# Patient Record
Sex: MALE | Race: WHITE | Employment: OTHER
[De-identification: names, ages, dates, MRNs, and addresses within clinical notes are randomized per-mention and may not be internally consistent; named-entity substitution may affect disease eponyms.]

---

## 2024-05-28 ENCOUNTER — APPOINTMENT (OUTPATIENT)
Facility: HOSPITAL | Age: 79
DRG: 233 | End: 2024-05-28
Attending: STUDENT IN AN ORGANIZED HEALTH CARE EDUCATION/TRAINING PROGRAM
Payer: MEDICARE

## 2024-05-28 ENCOUNTER — HOSPITAL ENCOUNTER (INPATIENT)
Facility: HOSPITAL | Age: 79
LOS: 5 days | Discharge: HOME HEALTH | DRG: 233 | End: 2024-06-04
Attending: STUDENT IN AN ORGANIZED HEALTH CARE EDUCATION/TRAINING PROGRAM | Admitting: THORACIC SURGERY (CARDIOTHORACIC VASCULAR SURGERY)
Payer: MEDICARE

## 2024-05-28 ENCOUNTER — PREP FOR PROCEDURE (OUTPATIENT)
Age: 79
End: 2024-05-28

## 2024-05-28 DIAGNOSIS — R94.39 ABNORMAL BALLISTOCARDIOGRAM: ICD-10-CM

## 2024-05-28 DIAGNOSIS — I25.119 CORONARY ARTERY DISEASE INVOLVING NATIVE CORONARY ARTERY OF NATIVE HEART WITH ANGINA PECTORIS (HCC): Primary | ICD-10-CM

## 2024-05-28 DIAGNOSIS — Z95.1 S/P CABG X 4: ICD-10-CM

## 2024-05-28 PROBLEM — I25.10 CORONARY ARTERY DISEASE: Status: ACTIVE | Noted: 2024-05-28

## 2024-05-28 LAB
ALBUMIN SERPL-MCNC: 3.3 G/DL (ref 3.5–5)
ALBUMIN/GLOB SERPL: 1 (ref 1.1–2.2)
ALP SERPL-CCNC: 67 U/L (ref 45–117)
ALT SERPL-CCNC: 24 U/L (ref 12–78)
ANION GAP SERPL CALC-SCNC: 5 MMOL/L (ref 5–15)
APPEARANCE UR: CLEAR
APTT PPP: 30.2 SEC (ref 22.1–31)
ARTERIAL PATENCY WRIST A: POSITIVE
AST SERPL-CCNC: 12 U/L (ref 15–37)
BACTERIA URNS QL MICRO: NEGATIVE /HPF
BASE DEFICIT BLD-SCNC: 0.2 MMOL/L
BASOPHILS # BLD: 0.1 K/UL (ref 0–0.1)
BASOPHILS NFR BLD: 1 % (ref 0–1)
BDY SITE: ABNORMAL
BILIRUB DIRECT SERPL-MCNC: 0.2 MG/DL (ref 0–0.2)
BILIRUB SERPL-MCNC: 1 MG/DL (ref 0.2–1)
BILIRUB UR QL: NEGATIVE
BUN SERPL-MCNC: 22 MG/DL (ref 6–20)
BUN/CREAT SERPL: 22 (ref 12–20)
CALCIUM SERPL-MCNC: 9.3 MG/DL (ref 8.5–10.1)
CHLORIDE SERPL-SCNC: 107 MMOL/L (ref 97–108)
CO2 SERPL-SCNC: 27 MMOL/L (ref 21–32)
COLOR UR: NORMAL
CREAT SERPL-MCNC: 1 MG/DL (ref 0.7–1.3)
DIFFERENTIAL METHOD BLD: ABNORMAL
ECHO BSA: 1.92 M2
EOSINOPHIL # BLD: 0.6 K/UL (ref 0–0.4)
EOSINOPHIL NFR BLD: 8 % (ref 0–7)
EPITH CASTS URNS QL MICRO: NORMAL /LPF
ERYTHROCYTE [DISTWIDTH] IN BLOOD BY AUTOMATED COUNT: 12.3 % (ref 11.5–14.5)
EST. AVERAGE GLUCOSE BLD GHB EST-MCNC: 111 MG/DL
FIBRINOGEN PPP-MCNC: 395 MG/DL (ref 200–475)
GAS FLOW.O2 O2 DELIVERY SYS: ABNORMAL
GLOBULIN SER CALC-MCNC: 3.4 G/DL (ref 2–4)
GLUCOSE SERPL-MCNC: 112 MG/DL (ref 65–100)
GLUCOSE UR STRIP.AUTO-MCNC: NEGATIVE MG/DL
HBA1C MFR BLD: 5.5 % (ref 4–5.6)
HCO3 BLD-SCNC: 24 MMOL/L (ref 22–26)
HCT VFR BLD AUTO: 46.7 % (ref 36.6–50.3)
HGB BLD-MCNC: 15.5 G/DL (ref 12.1–17)
HGB UR QL STRIP: NEGATIVE
HISTORY CHECK: NORMAL
HYALINE CASTS URNS QL MICRO: NORMAL /LPF (ref 0–2)
IMM GRANULOCYTES # BLD AUTO: 0 K/UL (ref 0–0.04)
IMM GRANULOCYTES NFR BLD AUTO: 1 % (ref 0–0.5)
INR PPP: 1 (ref 0.9–1.1)
KETONES UR QL STRIP.AUTO: NEGATIVE MG/DL
LEUKOCYTE ESTERASE UR QL STRIP.AUTO: NEGATIVE
LYMPHOCYTES # BLD: 2.1 K/UL (ref 0.8–3.5)
LYMPHOCYTES NFR BLD: 26 % (ref 12–49)
MAGNESIUM SERPL-MCNC: 2 MG/DL (ref 1.6–2.4)
MCH RBC QN AUTO: 31.4 PG (ref 26–34)
MCHC RBC AUTO-ENTMCNC: 33.2 G/DL (ref 30–36.5)
MCV RBC AUTO: 94.5 FL (ref 80–99)
MONOCYTES # BLD: 0.9 K/UL (ref 0–1)
MONOCYTES NFR BLD: 11 % (ref 5–13)
NEUTS SEG # BLD: 4.2 K/UL (ref 1.8–8)
NEUTS SEG NFR BLD: 53 % (ref 32–75)
NITRITE UR QL STRIP.AUTO: NEGATIVE
NRBC # BLD: 0 K/UL (ref 0–0.01)
NRBC BLD-RTO: 0 PER 100 WBC
NT PRO BNP: 488 PG/ML
PCO2 BLD: 37.2 MMHG (ref 35–45)
PH BLD: 7.42 (ref 7.35–7.45)
PH UR STRIP: 6.5 (ref 5–8)
PLATELET # BLD AUTO: 280 K/UL (ref 150–400)
PMV BLD AUTO: 9.9 FL (ref 8.9–12.9)
PO2 BLD: 76 MMHG (ref 80–100)
POTASSIUM SERPL-SCNC: 4 MMOL/L (ref 3.5–5.1)
PROT SERPL-MCNC: 6.7 G/DL (ref 6.4–8.2)
PROT UR STRIP-MCNC: NEGATIVE MG/DL
PROTHROMBIN TIME: 10.7 SEC (ref 9–11.1)
RBC # BLD AUTO: 4.94 M/UL (ref 4.1–5.7)
RBC #/AREA URNS HPF: NORMAL /HPF (ref 0–5)
SAO2 % BLD: 95.3 % (ref 92–97)
SODIUM SERPL-SCNC: 139 MMOL/L (ref 136–145)
SP GR UR REFRACTOMETRY: 1.01 (ref 1–1.03)
SPECIMEN TYPE: ABNORMAL
THERAPEUTIC RANGE: NORMAL SECS (ref 58–77)
URINE CULTURE IF INDICATED: NORMAL
UROBILINOGEN UR QL STRIP.AUTO: 1 EU/DL (ref 0.2–1)
WBC # BLD AUTO: 8 K/UL (ref 4.1–11.1)
WBC URNS QL MICRO: NORMAL /HPF (ref 0–4)

## 2024-05-28 PROCEDURE — 2580000003 HC RX 258: Performed by: NURSE PRACTITIONER

## 2024-05-28 PROCEDURE — 82803 BLOOD GASES ANY COMBINATION: CPT

## 2024-05-28 PROCEDURE — 86901 BLOOD TYPING SEROLOGIC RH(D): CPT

## 2024-05-28 PROCEDURE — 6370000000 HC RX 637 (ALT 250 FOR IP): Performed by: STUDENT IN AN ORGANIZED HEALTH CARE EDUCATION/TRAINING PROGRAM

## 2024-05-28 PROCEDURE — 36415 COLL VENOUS BLD VENIPUNCTURE: CPT

## 2024-05-28 PROCEDURE — 6370000000 HC RX 637 (ALT 250 FOR IP): Performed by: NURSE PRACTITIONER

## 2024-05-28 PROCEDURE — 85610 PROTHROMBIN TIME: CPT

## 2024-05-28 PROCEDURE — 86923 COMPATIBILITY TEST ELECTRIC: CPT

## 2024-05-28 PROCEDURE — 71046 X-RAY EXAM CHEST 2 VIEWS: CPT

## 2024-05-28 PROCEDURE — 93458 L HRT ARTERY/VENTRICLE ANGIO: CPT | Performed by: STUDENT IN AN ORGANIZED HEALTH CARE EDUCATION/TRAINING PROGRAM

## 2024-05-28 PROCEDURE — 83735 ASSAY OF MAGNESIUM: CPT

## 2024-05-28 PROCEDURE — 6360000004 HC RX CONTRAST MEDICATION: Performed by: NURSE PRACTITIONER

## 2024-05-28 PROCEDURE — 99223 1ST HOSP IP/OBS HIGH 75: CPT | Performed by: THORACIC SURGERY (CARDIOTHORACIC VASCULAR SURGERY)

## 2024-05-28 PROCEDURE — C1769 GUIDE WIRE: HCPCS | Performed by: STUDENT IN AN ORGANIZED HEALTH CARE EDUCATION/TRAINING PROGRAM

## 2024-05-28 PROCEDURE — 86850 RBC ANTIBODY SCREEN: CPT

## 2024-05-28 PROCEDURE — 80048 BASIC METABOLIC PNL TOTAL CA: CPT

## 2024-05-28 PROCEDURE — 6360000002 HC RX W HCPCS: Performed by: STUDENT IN AN ORGANIZED HEALTH CARE EDUCATION/TRAINING PROGRAM

## 2024-05-28 PROCEDURE — 2500000003 HC RX 250 WO HCPCS: Performed by: STUDENT IN AN ORGANIZED HEALTH CARE EDUCATION/TRAINING PROGRAM

## 2024-05-28 PROCEDURE — 80076 HEPATIC FUNCTION PANEL: CPT

## 2024-05-28 PROCEDURE — C1894 INTRO/SHEATH, NON-LASER: HCPCS | Performed by: STUDENT IN AN ORGANIZED HEALTH CARE EDUCATION/TRAINING PROGRAM

## 2024-05-28 PROCEDURE — 99153 MOD SED SAME PHYS/QHP EA: CPT | Performed by: STUDENT IN AN ORGANIZED HEALTH CARE EDUCATION/TRAINING PROGRAM

## 2024-05-28 PROCEDURE — 81001 URINALYSIS AUTO W/SCOPE: CPT

## 2024-05-28 PROCEDURE — 99152 MOD SED SAME PHYS/QHP 5/>YRS: CPT | Performed by: STUDENT IN AN ORGANIZED HEALTH CARE EDUCATION/TRAINING PROGRAM

## 2024-05-28 PROCEDURE — 4A023N7 MEASUREMENT OF CARDIAC SAMPLING AND PRESSURE, LEFT HEART, PERCUTANEOUS APPROACH: ICD-10-PCS | Performed by: STUDENT IN AN ORGANIZED HEALTH CARE EDUCATION/TRAINING PROGRAM

## 2024-05-28 PROCEDURE — 85730 THROMBOPLASTIN TIME PARTIAL: CPT

## 2024-05-28 PROCEDURE — 86900 BLOOD TYPING SEROLOGIC ABO: CPT

## 2024-05-28 PROCEDURE — 85384 FIBRINOGEN ACTIVITY: CPT

## 2024-05-28 PROCEDURE — 2580000003 HC RX 258: Performed by: STUDENT IN AN ORGANIZED HEALTH CARE EDUCATION/TRAINING PROGRAM

## 2024-05-28 PROCEDURE — 71275 CT ANGIOGRAPHY CHEST: CPT

## 2024-05-28 PROCEDURE — 2709999900 HC NON-CHARGEABLE SUPPLY: Performed by: STUDENT IN AN ORGANIZED HEALTH CARE EDUCATION/TRAINING PROGRAM

## 2024-05-28 PROCEDURE — 83036 HEMOGLOBIN GLYCOSYLATED A1C: CPT

## 2024-05-28 PROCEDURE — 83880 ASSAY OF NATRIURETIC PEPTIDE: CPT

## 2024-05-28 PROCEDURE — 85025 COMPLETE CBC W/AUTO DIFF WBC: CPT

## 2024-05-28 PROCEDURE — 6360000004 HC RX CONTRAST MEDICATION: Performed by: STUDENT IN AN ORGANIZED HEALTH CARE EDUCATION/TRAINING PROGRAM

## 2024-05-28 PROCEDURE — B2111ZZ FLUOROSCOPY OF MULTIPLE CORONARY ARTERIES USING LOW OSMOLAR CONTRAST: ICD-10-PCS | Performed by: STUDENT IN AN ORGANIZED HEALTH CARE EDUCATION/TRAINING PROGRAM

## 2024-05-28 PROCEDURE — 84443 ASSAY THYROID STIM HORMONE: CPT

## 2024-05-28 PROCEDURE — 36600 WITHDRAWAL OF ARTERIAL BLOOD: CPT

## 2024-05-28 RX ORDER — ATORVASTATIN CALCIUM 10 MG/1
10 TABLET, FILM COATED ORAL DAILY
Status: DISCONTINUED | OUTPATIENT
Start: 2024-05-28 | End: 2024-05-29

## 2024-05-28 RX ORDER — ACETAMINOPHEN 325 MG/1
650 TABLET ORAL EVERY 4 HOURS PRN
Status: DISCONTINUED | OUTPATIENT
Start: 2024-05-28 | End: 2024-05-30

## 2024-05-28 RX ORDER — HYDROCHLOROTHIAZIDE 25 MG/1
25 TABLET ORAL DAILY
Status: ON HOLD | COMMUNITY
End: 2024-06-03 | Stop reason: HOSPADM

## 2024-05-28 RX ORDER — ATORVASTATIN CALCIUM 10 MG/1
10 TABLET, FILM COATED ORAL DAILY
Status: ON HOLD | COMMUNITY
End: 2024-06-03 | Stop reason: HOSPADM

## 2024-05-28 RX ORDER — VERAPAMIL HYDROCHLORIDE 2.5 MG/ML
INJECTION, SOLUTION INTRAVENOUS PRN
Status: DISCONTINUED | OUTPATIENT
Start: 2024-05-28 | End: 2024-05-28 | Stop reason: HOSPADM

## 2024-05-28 RX ORDER — HEPARIN SODIUM 1000 [USP'U]/ML
INJECTION, SOLUTION INTRAVENOUS; SUBCUTANEOUS PRN
Status: DISCONTINUED | OUTPATIENT
Start: 2024-05-28 | End: 2024-05-28 | Stop reason: HOSPADM

## 2024-05-28 RX ORDER — SODIUM CHLORIDE 0.9 % (FLUSH) 0.9 %
5-40 SYRINGE (ML) INJECTION PRN
Status: DISCONTINUED | OUTPATIENT
Start: 2024-05-28 | End: 2024-05-30

## 2024-05-28 RX ORDER — HYDRALAZINE HYDROCHLORIDE 20 MG/ML
10 INJECTION INTRAMUSCULAR; INTRAVENOUS EVERY 10 MIN PRN
Status: DISCONTINUED | OUTPATIENT
Start: 2024-05-28 | End: 2024-05-30

## 2024-05-28 RX ORDER — FENTANYL CITRATE 50 UG/ML
INJECTION, SOLUTION INTRAMUSCULAR; INTRAVENOUS PRN
Status: DISCONTINUED | OUTPATIENT
Start: 2024-05-28 | End: 2024-05-28 | Stop reason: HOSPADM

## 2024-05-28 RX ORDER — HEPARIN SODIUM 10000 [USP'U]/ML
INJECTION, SOLUTION INTRAVENOUS; SUBCUTANEOUS PRN
Status: DISCONTINUED | OUTPATIENT
Start: 2024-05-28 | End: 2024-05-28 | Stop reason: HOSPADM

## 2024-05-28 RX ORDER — SODIUM CHLORIDE 0.9 % (FLUSH) 0.9 %
5-40 SYRINGE (ML) INJECTION EVERY 12 HOURS SCHEDULED
Status: DISCONTINUED | OUTPATIENT
Start: 2024-05-28 | End: 2024-05-30

## 2024-05-28 RX ORDER — LIDOCAINE HYDROCHLORIDE 10 MG/ML
INJECTION, SOLUTION INFILTRATION; PERINEURAL PRN
Status: DISCONTINUED | OUTPATIENT
Start: 2024-05-28 | End: 2024-05-28 | Stop reason: HOSPADM

## 2024-05-28 RX ORDER — ASPIRIN 81 MG/1
81 TABLET, CHEWABLE ORAL DAILY
Status: DISCONTINUED | OUTPATIENT
Start: 2024-05-28 | End: 2024-06-04 | Stop reason: HOSPADM

## 2024-05-28 RX ORDER — CHLORHEXIDINE GLUCONATE ORAL RINSE 1.2 MG/ML
15 SOLUTION DENTAL 2 TIMES DAILY
Status: DISCONTINUED | OUTPATIENT
Start: 2024-05-28 | End: 2024-05-30

## 2024-05-28 RX ORDER — SODIUM CHLORIDE 9 MG/ML
INJECTION, SOLUTION INTRAVENOUS PRN
Status: DISCONTINUED | OUTPATIENT
Start: 2024-05-28 | End: 2024-05-30

## 2024-05-28 RX ORDER — ONDANSETRON 2 MG/ML
4 INJECTION INTRAMUSCULAR; INTRAVENOUS EVERY 6 HOURS PRN
Status: DISCONTINUED | OUTPATIENT
Start: 2024-05-28 | End: 2024-05-30

## 2024-05-28 RX ORDER — SODIUM CHLORIDE 0.9 % (FLUSH) 0.9 %
5-40 SYRINGE (ML) INJECTION EVERY 8 HOURS
Status: DISCONTINUED | OUTPATIENT
Start: 2024-05-28 | End: 2024-05-30

## 2024-05-28 RX ORDER — ASPIRIN 81 MG/1
81 TABLET, CHEWABLE ORAL DAILY
COMMUNITY

## 2024-05-28 RX ORDER — AMIODARONE HYDROCHLORIDE 200 MG/1
400 TABLET ORAL 2 TIMES DAILY
Status: DISCONTINUED | OUTPATIENT
Start: 2024-05-28 | End: 2024-05-28

## 2024-05-28 RX ADMIN — 0.12% CHLORHEXIDINE GLUCONATE 15 ML: 1.2 RINSE ORAL at 20:54

## 2024-05-28 RX ADMIN — IOPAMIDOL 100 ML: 755 INJECTION, SOLUTION INTRAVENOUS at 15:26

## 2024-05-28 RX ADMIN — SODIUM CHLORIDE, PRESERVATIVE FREE 10 ML: 5 INJECTION INTRAVENOUS at 20:55

## 2024-05-28 RX ADMIN — ASPIRIN 81 MG CHEWABLE TABLET 81 MG: 81 TABLET CHEWABLE at 20:54

## 2024-05-28 RX ADMIN — 0.12% CHLORHEXIDINE GLUCONATE 15 ML: 1.2 RINSE ORAL at 14:26

## 2024-05-28 RX ADMIN — ATORVASTATIN CALCIUM 10 MG: 10 TABLET, FILM COATED ORAL at 20:54

## 2024-05-28 RX ADMIN — METOPROLOL TARTRATE 12.5 MG: 25 TABLET, FILM COATED ORAL at 20:54

## 2024-05-28 RX ADMIN — MUPIROCIN: 20 OINTMENT TOPICAL at 14:26

## 2024-05-28 RX ADMIN — SODIUM CHLORIDE, PRESERVATIVE FREE 10 ML: 5 INJECTION INTRAVENOUS at 13:00

## 2024-05-28 RX ADMIN — MUPIROCIN: 20 OINTMENT TOPICAL at 20:54

## 2024-05-28 RX ADMIN — SODIUM CHLORIDE, PRESERVATIVE FREE 10 ML: 5 INJECTION INTRAVENOUS at 20:54

## 2024-05-28 NOTE — H&P
JVP- not well appreciated   RS: Non labored, clear   CVS: Regular rate and rhythm, S1S2, no murmur   Abd: Soft, non tender, non distended   Lower extremity: Warm to touch, Edema- None   Skin: Warm and dry, No significant bruises or rash   CNS: Oriented x3, no focal neuro deficit     Lab review:       CBC:  Lab Results   Component Value Date/Time    WBC 8.0 05/28/2024 08:02 AM    HGB 15.5 05/28/2024 08:02 AM    HCT 46.7 05/28/2024 08:02 AM     05/28/2024 08:02 AM    MCV 94.5 05/28/2024 08:02 AM       All Cardiac Markers in the last 24 hours:  @[email or web address](CPK:*,CK:*,CPKMB:*,CKRMB:*,CKMMB:*,CKMB:*,RCK3:*,CKMBT:*,CKMBPC:*,CKMBPOC:*,CKSMB:*,CKNDX:*,CKND1:*,JAEL:*,TROQR:*,TROPT:*,TROIQ:*,TROIP:*,AUGUSTINE:*,TROPIT:*,TROPT:*,TRPOIT:*,ITNL:*,TNIPOC:*,BNP:*,BNPP:*,PBNP:*,BNPNT:*)@    Data review:  EKG tracing personally reviewed:     Echocardiogram:    Other cardiac testing:    Other imaging:        Signed:  Issa Lentz MD  Interventional Cardiology  5/28/2024

## 2024-05-28 NOTE — PROGRESS NOTES
Cardiac Cath Lab Recovery Arrival Note:      Sukhdeep Lomeli arrived to Cardiac Cath Lab, Recovery Area. Staff introduced to patient. Patient identifiers verified with NAME and DATE OF BIRTH. Procedure verified with patient. Consent forms reviewed and signed by patient or authorized representative and verified. Allergies verified.     Patient and family oriented to department. Patient and family informed of procedure and plan of care.     Questions answered with review. Patient prepped for procedure, per orders from physician, prior to arrival.    Patient on cardiac monitor, non-invasive blood pressure, SPO2 monitor. On RA. Patient is A&Ox 4. Patient reports no pain.     Patient in stretcher, in low position, with side rails up, call bell within reach, patient instructed to call if assistance as needed.    Patient prep in: [facility], [place] [facility].     Family in: Naomi, wife.   Prep by: Norma

## 2024-05-28 NOTE — PROGRESS NOTES
9:50 AM Patient arrived to IVCU from Cath Lab. Right radial, TR band at 12cc, CDI, no bleeding/hematoma. Immobilizer in place. VSS, AV Paced, RA, Afebrile. Patient on bedrest. Family at bedside, but has not talked to the physician about the results of the cath.     11:29 AM Cardiac surgery NP at bedside with patient and spouse. Right radial CDI, no bleeding/hematoma.      12:30 PM Labs and EKG complete.     1:30 PM TR band off. Right radial site CDI, no bleeding/hematoma. Gauze and tegaderm over site. Pt educated that they can remove the dressing in 24hrs and wash it with soap and water (no lotions/bravo). Bedrest complete, patient ambulated x 0 assist to the bathroom to void. Urine sample sent.     3 PM Patient CHRISTAL to radiology.     3:45 PM Patient returned to IVCU. MD Chadwick at bedside. Incentive spirometry education given by RN.    7:46 PM Bedside shift report given to SHANICE Musa.

## 2024-05-29 ENCOUNTER — APPOINTMENT (OUTPATIENT)
Facility: HOSPITAL | Age: 79
DRG: 233 | End: 2024-05-29
Attending: STUDENT IN AN ORGANIZED HEALTH CARE EDUCATION/TRAINING PROGRAM
Payer: MEDICARE

## 2024-05-29 ENCOUNTER — ANESTHESIA EVENT (OUTPATIENT)
Facility: HOSPITAL | Age: 79
End: 2024-05-29
Payer: MEDICARE

## 2024-05-29 PROBLEM — I65.23 BILATERAL CAROTID ARTERY STENOSIS: Status: ACTIVE | Noted: 2024-05-29

## 2024-05-29 PROBLEM — Z01.810 PREOP CARDIOVASCULAR EXAM: Status: ACTIVE | Noted: 2024-05-29

## 2024-05-29 LAB
ECHO BSA: 1.92 M2
EKG ATRIAL RATE: 60 BPM
EKG DIAGNOSIS: NORMAL
EKG P AXIS: 35 DEGREES
EKG P-R INTERVAL: 104 MS
EKG Q-T INTERVAL: 476 MS
EKG QRS DURATION: 150 MS
EKG QTC CALCULATION (BAZETT): 476 MS
EKG R AXIS: -43 DEGREES
EKG T AXIS: 88 DEGREES
EKG VENTRICULAR RATE: 60 BPM
TSH SERPL DL<=0.05 MIU/L-ACNC: 2.81 UIU/ML (ref 0.36–3.74)
VAS LEFT CCA DIST EDV: 0 CM/S
VAS LEFT CCA DIST PSV: 98.4 CM/S
VAS LEFT CCA PROX EDV: 13.6 CM/S
VAS LEFT CCA PROX PSV: 96.4 CM/S
VAS LEFT ECA EDV: 0 CM/S
VAS LEFT ECA PSV: 61.7 CM/S
VAS LEFT ICA DIST EDV: 18.1 CM/S
VAS LEFT ICA DIST PSV: 87.6 CM/S
VAS LEFT ICA MID EDV: 14.9 CM/S
VAS LEFT ICA MID PSV: 69.8 CM/S
VAS LEFT ICA PROX EDV: 11.6 CM/S
VAS LEFT ICA PROX PSV: 61.7 CM/S
VAS LEFT ICA/CCA PSV: 0.89 NO UNITS
VAS LEFT SUBCLAVIAN PROX EDV: 0 CM/S
VAS LEFT SUBCLAVIAN PROX PSV: 126 CM/S
VAS LEFT VERTEBRAL EDV: 6.81 CM/S
VAS LEFT VERTEBRAL PSV: 37.6 CM/S
VAS RIGHT CCA DIST EDV: 6.3 CM/S
VAS RIGHT CCA DIST PSV: 65.9 CM/S
VAS RIGHT CCA PROX EDV: 0 CM/S
VAS RIGHT CCA PROX PSV: 82.7 CM/S
VAS RIGHT ECA EDV: 0 CM/S
VAS RIGHT ECA PSV: 96.9 CM/S
VAS RIGHT ICA DIST EDV: 11.5 CM/S
VAS RIGHT ICA DIST PSV: 52.9 CM/S
VAS RIGHT ICA MID EDV: 14.1 CM/S
VAS RIGHT ICA MID PSV: 68.5 CM/S
VAS RIGHT ICA PROX EDV: 11.5 CM/S
VAS RIGHT ICA PROX PSV: 62 CM/S
VAS RIGHT ICA/CCA PSV: 1 NO UNITS
VAS RIGHT SUBCLAVIAN PROX EDV: 0 CM/S
VAS RIGHT SUBCLAVIAN PROX PSV: 122 CM/S
VAS RIGHT VERTEBRAL EDV: 9.51 CM/S
VAS RIGHT VERTEBRAL PSV: 33.1 CM/S

## 2024-05-29 PROCEDURE — 94060 EVALUATION OF WHEEZING: CPT

## 2024-05-29 PROCEDURE — 2580000003 HC RX 258: Performed by: STUDENT IN AN ORGANIZED HEALTH CARE EDUCATION/TRAINING PROGRAM

## 2024-05-29 PROCEDURE — 2580000003 HC RX 258: Performed by: NURSE PRACTITIONER

## 2024-05-29 PROCEDURE — 94640 AIRWAY INHALATION TREATMENT: CPT

## 2024-05-29 PROCEDURE — 6370000000 HC RX 637 (ALT 250 FOR IP): Performed by: THORACIC SURGERY (CARDIOTHORACIC VASCULAR SURGERY)

## 2024-05-29 PROCEDURE — 6370000000 HC RX 637 (ALT 250 FOR IP): Performed by: STUDENT IN AN ORGANIZED HEALTH CARE EDUCATION/TRAINING PROGRAM

## 2024-05-29 PROCEDURE — 84443 ASSAY THYROID STIM HORMONE: CPT

## 2024-05-29 PROCEDURE — 36415 COLL VENOUS BLD VENIPUNCTURE: CPT

## 2024-05-29 PROCEDURE — 93880 EXTRACRANIAL BILAT STUDY: CPT | Performed by: PSYCHIATRY & NEUROLOGY

## 2024-05-29 PROCEDURE — 93970 EXTREMITY STUDY: CPT

## 2024-05-29 PROCEDURE — 6370000000 HC RX 637 (ALT 250 FOR IP): Performed by: NURSE PRACTITIONER

## 2024-05-29 PROCEDURE — 93880 EXTRACRANIAL BILAT STUDY: CPT

## 2024-05-29 PROCEDURE — 94729 DIFFUSING CAPACITY: CPT

## 2024-05-29 RX ORDER — NALOXONE HYDROCHLORIDE 0.4 MG/ML
INJECTION, SOLUTION INTRAMUSCULAR; INTRAVENOUS; SUBCUTANEOUS PRN
Status: CANCELLED | OUTPATIENT
Start: 2024-05-29

## 2024-05-29 RX ORDER — OXYCODONE HYDROCHLORIDE 5 MG/1
5 TABLET ORAL
Status: CANCELLED | OUTPATIENT
Start: 2024-05-29 | End: 2024-05-30

## 2024-05-29 RX ORDER — IPRATROPIUM BROMIDE AND ALBUTEROL SULFATE 2.5; .5 MG/3ML; MG/3ML
1 SOLUTION RESPIRATORY (INHALATION)
Status: COMPLETED | OUTPATIENT
Start: 2024-05-29 | End: 2024-05-29

## 2024-05-29 RX ORDER — ATORVASTATIN CALCIUM 40 MG/1
40 TABLET, FILM COATED ORAL DAILY
Status: DISCONTINUED | OUTPATIENT
Start: 2024-05-29 | End: 2024-06-04 | Stop reason: HOSPADM

## 2024-05-29 RX ORDER — FENTANYL CITRATE 50 UG/ML
100 INJECTION, SOLUTION INTRAMUSCULAR; INTRAVENOUS
Status: CANCELLED | OUTPATIENT
Start: 2024-05-29 | End: 2024-05-30

## 2024-05-29 RX ORDER — HYDRALAZINE HYDROCHLORIDE 20 MG/ML
10 INJECTION INTRAMUSCULAR; INTRAVENOUS
Status: CANCELLED | OUTPATIENT
Start: 2024-05-29

## 2024-05-29 RX ORDER — MIDAZOLAM HYDROCHLORIDE 2 MG/2ML
2 INJECTION, SOLUTION INTRAMUSCULAR; INTRAVENOUS
Status: CANCELLED | OUTPATIENT
Start: 2024-05-29 | End: 2024-05-30

## 2024-05-29 RX ORDER — ACETAMINOPHEN 500 MG
1000 TABLET ORAL ONCE
Status: CANCELLED | OUTPATIENT
Start: 2024-05-29 | End: 2024-05-29

## 2024-05-29 RX ORDER — AMIODARONE HYDROCHLORIDE 200 MG/1
400 TABLET ORAL 2 TIMES DAILY
Status: DISCONTINUED | OUTPATIENT
Start: 2024-05-29 | End: 2024-05-30

## 2024-05-29 RX ORDER — SODIUM CHLORIDE, SODIUM LACTATE, POTASSIUM CHLORIDE, CALCIUM CHLORIDE 600; 310; 30; 20 MG/100ML; MG/100ML; MG/100ML; MG/100ML
INJECTION, SOLUTION INTRAVENOUS CONTINUOUS
Status: CANCELLED | OUTPATIENT
Start: 2024-05-29

## 2024-05-29 RX ORDER — LIDOCAINE HYDROCHLORIDE 10 MG/ML
1 INJECTION, SOLUTION EPIDURAL; INFILTRATION; INTRACAUDAL; PERINEURAL
Status: CANCELLED | OUTPATIENT
Start: 2024-05-29 | End: 2024-05-30

## 2024-05-29 RX ORDER — SODIUM CHLORIDE 0.9 % (FLUSH) 0.9 %
5-40 SYRINGE (ML) INJECTION PRN
Status: CANCELLED | OUTPATIENT
Start: 2024-05-29

## 2024-05-29 RX ORDER — SODIUM CHLORIDE 0.9 % (FLUSH) 0.9 %
5-40 SYRINGE (ML) INJECTION EVERY 12 HOURS SCHEDULED
Status: CANCELLED | OUTPATIENT
Start: 2024-05-29

## 2024-05-29 RX ORDER — SODIUM CHLORIDE 9 MG/ML
INJECTION, SOLUTION INTRAVENOUS PRN
Status: CANCELLED | OUTPATIENT
Start: 2024-05-29

## 2024-05-29 RX ORDER — ONDANSETRON 2 MG/ML
4 INJECTION INTRAMUSCULAR; INTRAVENOUS
Status: CANCELLED | OUTPATIENT
Start: 2024-05-29 | End: 2024-05-30

## 2024-05-29 RX ORDER — FENTANYL CITRATE 50 UG/ML
25 INJECTION, SOLUTION INTRAMUSCULAR; INTRAVENOUS EVERY 5 MIN PRN
Status: CANCELLED | OUTPATIENT
Start: 2024-05-29

## 2024-05-29 RX ORDER — PROCHLORPERAZINE EDISYLATE 5 MG/ML
5 INJECTION INTRAMUSCULAR; INTRAVENOUS
Status: CANCELLED | OUTPATIENT
Start: 2024-05-29 | End: 2024-05-30

## 2024-05-29 RX ORDER — HYDROMORPHONE HYDROCHLORIDE 1 MG/ML
0.5 INJECTION, SOLUTION INTRAMUSCULAR; INTRAVENOUS; SUBCUTANEOUS EVERY 5 MIN PRN
Status: CANCELLED | OUTPATIENT
Start: 2024-05-29

## 2024-05-29 RX ORDER — AMLODIPINE BESYLATE 5 MG/1
5 TABLET ORAL DAILY
Status: DISCONTINUED | OUTPATIENT
Start: 2024-05-29 | End: 2024-05-30

## 2024-05-29 RX ADMIN — METOPROLOL TARTRATE 25 MG: 25 TABLET, FILM COATED ORAL at 21:03

## 2024-05-29 RX ADMIN — IPRATROPIUM BROMIDE AND ALBUTEROL SULFATE 1 DOSE: .5; 3 SOLUTION RESPIRATORY (INHALATION) at 09:55

## 2024-05-29 RX ADMIN — ATORVASTATIN CALCIUM 40 MG: 40 TABLET, FILM COATED ORAL at 21:02

## 2024-05-29 RX ADMIN — SODIUM CHLORIDE, PRESERVATIVE FREE 10 ML: 5 INJECTION INTRAVENOUS at 21:08

## 2024-05-29 RX ADMIN — AMLODIPINE BESYLATE 5 MG: 5 TABLET ORAL at 12:46

## 2024-05-29 RX ADMIN — SODIUM CHLORIDE, PRESERVATIVE FREE 10 ML: 5 INJECTION INTRAVENOUS at 08:29

## 2024-05-29 RX ADMIN — AMIODARONE HYDROCHLORIDE 400 MG: 200 TABLET ORAL at 21:03

## 2024-05-29 RX ADMIN — SODIUM CHLORIDE, PRESERVATIVE FREE 10 ML: 5 INJECTION INTRAVENOUS at 21:09

## 2024-05-29 RX ADMIN — 0.12% CHLORHEXIDINE GLUCONATE 15 ML: 1.2 RINSE ORAL at 08:35

## 2024-05-29 RX ADMIN — MUPIROCIN: 20 OINTMENT TOPICAL at 23:34

## 2024-05-29 RX ADMIN — 0.12% CHLORHEXIDINE GLUCONATE 15 ML: 1.2 RINSE ORAL at 21:07

## 2024-05-29 RX ADMIN — MUPIROCIN: 20 OINTMENT TOPICAL at 10:38

## 2024-05-29 RX ADMIN — ASPIRIN 81 MG CHEWABLE TABLET 81 MG: 81 TABLET CHEWABLE at 21:02

## 2024-05-29 RX ADMIN — METOPROLOL TARTRATE 12.5 MG: 25 TABLET, FILM COATED ORAL at 08:28

## 2024-05-29 RX ADMIN — SODIUM CHLORIDE, PRESERVATIVE FREE 10 ML: 5 INJECTION INTRAVENOUS at 14:10

## 2024-05-29 NOTE — PROGRESS NOTES
Progress Note      5/29/2024 10:10 AM  NAME: Sukhdeep Lomeli   MRN:  [id number]   Admit Diagnosis: Abnormal ballistocardiogram [R94.39]           Assessment:     1.  CAD and multivessel disease involving LM disease and  of OM    Second-degree AV block s/p pacemaker  3.  Hypertension  Hyperlipidemia  Mild systolic heart failure with ejection fraction 42% by MPI       Cath:  Severe three-vessel coronary artery disease including severe left main bifurcation disease and  of obtuse marginal branch    LVEDP 20 mmHg     Recommendations:      Cont aspirin   Cont lipitor > increase dose to 40 mg po daily   Cont metoprolol   Add amlodipine for elevated BP     CT surgery following for CABG tomorrow         [x]        High complexity decision making was performed    Subjective:     HPI:     No CP or SOB     ROS: No CP, SOB, Abd pain, nausea, vomiting, syncope, palpitations, new focal neurological symptoms     Objective:      Physical Exam:    Last 24hrs VS reviewed since prior progress note. Most recent are:    BP (!) 169/76   Pulse 60   Temp 98 °F (36.7 °C) (Oral)   Resp 18   Ht 1.676 m (5' 6\")   Wt 79.4 kg (175 lb)   SpO2 95%   BMI 28.25 kg/m²   No intake or output data in the 24 hours ending 05/29/24 1010      General: Alert and oriented x3, no acute distress   Neck: Supple   Respiratory: No respiratory distress, clear lung sound   Cardiovascular: Regular rate rhythm, S1S2, no murmur   Abdomen: soft, non tender, non distended   Neuro: moves all extremities, oriented x3   Skin: warm and dry   Extremity: no edema, warm to touch      Data Review     Tele: NSR    Lab Data Personally Reviewed:    Recent Labs     05/28/24  0802   WBC 8.0   HGB 15.5   HCT 46.7        Recent Labs     05/28/24  1252   INR 1.0   APTT 30.2      Recent Labs     05/28/24  0802 05/28/24  1252     --    K 4.0  --      --    CO2 27  --    BUN 22*  --    MG  --  2.0     No results for input(s): \"CPK\" in the last 72

## 2024-05-29 NOTE — PROGRESS NOTES
Cardiac Surgery FLOOR Progress Note    Admit Date: 2024        Subjective:   Pt seen with Dr. Chadwick. Afebrile, on RA. Denies CP. In bed, no complaints.     Objective:     Vitals:    24 1002   BP:    Pulse:    Resp:    Temp:    SpO2: 95%       Temp (24hrs), Av.8 °F (36.6 °C), Min:97.5 °F (36.4 °C), Max:98 °F (36.7 °C)      Last 24hr Input/Output:  No intake or output data in the 24 hours ending 24 1007     EKG: paced    Oxygen: RA        Admission Weight: Last Weight   Weight - Scale: 79.4 kg (175 lb) Weight - Scale: 79.4 kg (175 lb)       EXAM:      Lungs:   Clear to auscultation bilaterally.       Heart:  Regular rate and rhythm, S1, S2 normal, no murmur, click, rub or gallop.     Abdomen:   Soft, non-tender. Bowel sounds normal. No masses,  No organomegaly.   Extremities:  No edema. PPP.      Neurologic:  Gross motor and sensory apparatus intact.       Activity: up ad ally    Diet:  cardiac    Lab Data Reviewed:   Recent Labs     24  0802 24  1252   WBC 8.0  --    HGB 15.5  --    HCT 46.7  --      --    INR  --  1.0       Cardiac Testing:   Diagnostics:   PA and lateral: Xray Result (most recent):  XR CHEST STANDARD TWO VW 2024    Narrative  CLINICAL HISTORY: preop heart workup  INDICATION:   preop heart workup  COMPARISON: None    FINDINGS:  PA and lateral views of the chest are obtained.  The cardiopericardial silhouette is within normal limits. There is no pleural  effusion, pneumothorax or focal consolidation present. Cardiac pacer. Right  shoulder arthroplasty.    Impression  No acute intrathoracic process.         Vein mapping: pending     Carotid doppler: pending     PFTS-FEV1: pending     EKG:         Encounter Date: 24   EKG 12 Lead   Result Value     Ventricular Rate 60     Atrial Rate 60     P-R Interval 104     QRS Duration 150     Q-T Interval 476     QTc Calculation (Bazett) 476     P Axis 35     R Axis -43     T Axis 88     Diagnosis

## 2024-05-29 NOTE — PROGRESS NOTES
End of Shift Note    Bedside shift change report given to SHANICE Chanel (oncoming nurse) by Dimple Valentine RN (offgoing nurse).  Report included the following information SBAR, Kardex, ED Summary, Intake/Output, and MAR    Shift worked:  Night     Shift summary and any significant changes:     No acute changes overnight      Concerns for physician to address:       Zone phone for oncoming shift:          Activity:     Number times ambulated in hallways past shift: 2  Number of times OOB to chair past shift: 1    Cardiac:   Cardiac Monitoring: Yes           Access:  Current line(s): PIV     Genitourinary:   Urinary status: voiding    Respiratory:      Chronic home O2 use?: NO  Incentive spirometer at bedside: YES       GI:     Current diet:  ADULT DIET; Regular; Low Fat/Low Chol/High Fiber/2 gm Na  ADULT ORAL NUTRITION SUPPLEMENT; Breakfast, Lunch, Dinner; Low Calorie/High Protein Oral Supplement  Diet NPO Exceptions are: Sips of Clear Liquids  Passing flatus: YES  Tolerating current diet: YES       Pain Management:   Patient states pain is manageable on current regimen: YES    Skin:     Interventions: increase time out of bed    Patient Safety:  Fall Score:    Interventions: gripper socks, pt to call before getting OOB, and stay with me (per policy)       Length of Stay:  Expected LOS:   Actual LOS: 0      Dimple Valentine RN

## 2024-05-29 NOTE — PROGRESS NOTES
Cardiac Surgery Care Coordinator met with Sukhdeep Lomeli. Introduced role of the Cardiac Surgery Care Coordinator.  Reviewed plan of care and began pre-op education.  Discussed day of surgery expectations for the pt and family.  He is able to pull 2500ml on the spirometer.  Provided ERAS binder and reviewed material including Cardiac Surgery Pathway. Reinforced sternal precautions and keeping your move in the tube. Encouraged Sukhdeep Lomeli  to verbalize and offered emotional support. Di Lambert RN

## 2024-05-30 ENCOUNTER — APPOINTMENT (OUTPATIENT)
Facility: HOSPITAL | Age: 79
DRG: 233 | End: 2024-05-30
Attending: STUDENT IN AN ORGANIZED HEALTH CARE EDUCATION/TRAINING PROGRAM
Payer: MEDICARE

## 2024-05-30 ENCOUNTER — HOSPITAL ENCOUNTER (OUTPATIENT)
Facility: HOSPITAL | Age: 79
Discharge: HOME | End: 2024-06-01
Attending: THORACIC SURGERY (CARDIOTHORACIC VASCULAR SURGERY)

## 2024-05-30 ENCOUNTER — ANESTHESIA (OUTPATIENT)
Facility: HOSPITAL | Age: 79
End: 2024-05-30
Payer: MEDICARE

## 2024-05-30 PROBLEM — I25.10 CAD IN NATIVE ARTERY: Status: ACTIVE | Noted: 2024-05-30

## 2024-05-30 PROBLEM — Z95.1 S/P CABG X 4: Status: ACTIVE | Noted: 2024-05-30

## 2024-05-30 PROBLEM — Z98.890 S/P LEFT ATRIAL APPENDAGE LIGATION: Status: ACTIVE | Noted: 2024-05-30

## 2024-05-30 LAB
ABO + RH BLD: NORMAL
ACUTE KIDNEY INJURY RISK NEPHROCHECK: 0.27 (ref 0–0.3)
ALBUMIN SERPL-MCNC: 3.4 G/DL (ref 3.5–5)
ALBUMIN SERPL-MCNC: 3.5 G/DL (ref 3.5–5)
ALBUMIN/GLOB SERPL: 1.6 (ref 1.1–2.2)
ALBUMIN/GLOB SERPL: 1.6 (ref 1.1–2.2)
ALP SERPL-CCNC: 44 U/L (ref 45–117)
ALT SERPL-CCNC: 19 U/L (ref 12–78)
ALT SERPL-CCNC: 21 U/L (ref 12–78)
ANION GAP BLD CALC-SCNC: 12 (ref 10–20)
ANION GAP BLD CALC-SCNC: 13 (ref 10–20)
ANION GAP BLD CALC-SCNC: 6 (ref 10–20)
ANION GAP BLD CALC-SCNC: 8 (ref 10–20)
ANION GAP BLD CALC-SCNC: 9 (ref 10–20)
ANION GAP SERPL CALC-SCNC: 3 MMOL/L (ref 5–15)
ANION GAP SERPL CALC-SCNC: 5 MMOL/L (ref 5–15)
APTT PPP: 29.1 SEC (ref 22.1–31)
ARTERIAL PATENCY WRIST A: ABNORMAL
ARTERIAL PATENCY WRIST A: ABNORMAL
AST SERPL-CCNC: 29 U/L (ref 15–37)
AST SERPL-CCNC: 32 U/L (ref 15–37)
BASE DEFICIT BLD-SCNC: 1 MMOL/L
BASE DEFICIT BLD-SCNC: 1.5 MMOL/L
BASE DEFICIT BLD-SCNC: 1.8 MMOL/L
BASE DEFICIT BLD-SCNC: 2.3 MMOL/L
BASE DEFICIT BLD-SCNC: 4.2 MMOL/L
BASE DEFICIT BLD-SCNC: 4.2 MMOL/L
BASE DEFICIT BLD-SCNC: 4.7 MMOL/L
BASE DEFICIT BLDA-SCNC: 4.9 MMOL/L
BASE DEFICIT BLDA-SCNC: 5.5 MMOL/L
BASE EXCESS BLD CALC-SCNC: 0.1 MMOL/L
BDY SITE: ABNORMAL
BDY SITE: ABNORMAL
BILIRUB SERPL-MCNC: 0.6 MG/DL (ref 0.2–1)
BILIRUB SERPL-MCNC: 1.2 MG/DL (ref 0.2–1)
BLD PROD TYP BPU: NORMAL
BLD PROD TYP BPU: NORMAL
BLOOD BANK DISPENSE STATUS: NORMAL
BLOOD BANK DISPENSE STATUS: NORMAL
BLOOD GROUP ANTIBODIES SERPL: NORMAL
BPU ID: NORMAL
BPU ID: NORMAL
BUN SERPL-MCNC: 16 MG/DL (ref 6–20)
BUN SERPL-MCNC: 18 MG/DL (ref 6–20)
BUN/CREAT SERPL: 16 (ref 12–20)
BUN/CREAT SERPL: 20 (ref 12–20)
CA-I BLD-MCNC: 1.04 MMOL/L (ref 1.12–1.32)
CA-I BLD-MCNC: 1.04 MMOL/L (ref 1.12–1.32)
CA-I BLD-MCNC: 1.06 MMOL/L (ref 1.12–1.32)
CA-I BLD-MCNC: 1.08 MMOL/L (ref 1.12–1.32)
CA-I BLD-MCNC: 1.13 MMOL/L (ref 1.12–1.32)
CA-I BLD-MCNC: 1.13 MMOL/L (ref 1.12–1.32)
CA-I BLD-MCNC: 1.19 MMOL/L (ref 1.12–1.32)
CA-I BLD-MCNC: 1.22 MMOL/L (ref 1.12–1.32)
CA-I BLD-SCNC: 1.07 MMOL/L (ref 1.13–1.32)
CHLORIDE BLD-SCNC: 105 MMOL/L (ref 100–108)
CHLORIDE BLD-SCNC: 106 MMOL/L (ref 100–108)
CHLORIDE BLD-SCNC: 108 MMOL/L (ref 100–108)
CHLORIDE BLD-SCNC: 108 MMOL/L (ref 100–108)
CHLORIDE BLD-SCNC: 110 MMOL/L (ref 100–108)
CHLORIDE BLD-SCNC: 111 MMOL/L (ref 100–108)
CHLORIDE SERPL-SCNC: 115 MMOL/L (ref 97–108)
CHLORIDE SERPL-SCNC: 116 MMOL/L (ref 97–108)
CO2 BLD-SCNC: 20 MMOL/L (ref 19–24)
CO2 BLD-SCNC: 21 MMOL/L (ref 19–24)
CO2 BLD-SCNC: 22 MMOL/L (ref 19–24)
CO2 BLD-SCNC: 23 MMOL/L (ref 19–24)
CO2 BLD-SCNC: 26 MMOL/L (ref 19–24)
CO2 SERPL-SCNC: 23 MMOL/L (ref 21–32)
CO2 SERPL-SCNC: 23 MMOL/L (ref 21–32)
CREAT SERPL-MCNC: 0.89 MG/DL (ref 0.7–1.3)
CREAT SERPL-MCNC: 0.97 MG/DL (ref 0.7–1.3)
CREAT UR-MCNC: 0.6 MG/DL (ref 0.6–1.3)
CREAT UR-MCNC: 0.7 MG/DL (ref 0.6–1.3)
CREAT UR-MCNC: 0.8 MG/DL (ref 0.6–1.3)
CROSSMATCH RESULT: NORMAL
CROSSMATCH RESULT: NORMAL
ECHO BSA: 1.92 M2
ERYTHROCYTE [DISTWIDTH] IN BLOOD BY AUTOMATED COUNT: 12.6 % (ref 11.5–14.5)
FIO2 ON VENT: 40 %
FIO2 ON VENT: 60 %
GAS FLOW.O2 SETTING OXYMISER: 16
GLOBULIN SER CALC-MCNC: 2.1 G/DL (ref 2–4)
GLOBULIN SER CALC-MCNC: 2.2 G/DL (ref 2–4)
GLUCOSE BLD STRIP.AUTO-MCNC: 104 MG/DL (ref 65–117)
GLUCOSE BLD STRIP.AUTO-MCNC: 104 MG/DL (ref 74–99)
GLUCOSE BLD STRIP.AUTO-MCNC: 107 MG/DL (ref 65–117)
GLUCOSE BLD STRIP.AUTO-MCNC: 116 MG/DL (ref 65–117)
GLUCOSE BLD STRIP.AUTO-MCNC: 116 MG/DL (ref 74–99)
GLUCOSE BLD STRIP.AUTO-MCNC: 119 MG/DL (ref 65–117)
GLUCOSE BLD STRIP.AUTO-MCNC: 127 MG/DL (ref 65–117)
GLUCOSE BLD STRIP.AUTO-MCNC: 135 MG/DL (ref 65–117)
GLUCOSE BLD STRIP.AUTO-MCNC: 138 MG/DL (ref 65–117)
GLUCOSE BLD STRIP.AUTO-MCNC: 139 MG/DL (ref 74–99)
GLUCOSE BLD STRIP.AUTO-MCNC: 146 MG/DL (ref 65–117)
GLUCOSE BLD STRIP.AUTO-MCNC: 162 MG/DL (ref 74–99)
GLUCOSE BLD STRIP.AUTO-MCNC: 175 MG/DL (ref 74–99)
GLUCOSE BLD STRIP.AUTO-MCNC: 180 MG/DL (ref 74–99)
GLUCOSE BLD STRIP.AUTO-MCNC: 205 MG/DL (ref 74–99)
GLUCOSE BLD STRIP.AUTO-MCNC: 54 MG/DL (ref 65–117)
GLUCOSE BLD STRIP.AUTO-MCNC: 91 MG/DL (ref 65–117)
GLUCOSE BLD STRIP.AUTO-MCNC: 94 MG/DL (ref 74–99)
GLUCOSE SERPL-MCNC: 175 MG/DL (ref 65–100)
GLUCOSE SERPL-MCNC: 73 MG/DL (ref 65–100)
HCO3 BLDA-SCNC: 20 MMOL/L (ref 22–26)
HCO3 BLDA-SCNC: 20 MMOL/L (ref 22–26)
HCO3 BLDA-SCNC: 21 MMOL/L
HCO3 BLDA-SCNC: 22 MMOL/L
HCO3 BLDA-SCNC: 23 MMOL/L
HCO3 BLDA-SCNC: 24 MMOL/L
HCO3 BLDA-SCNC: 24 MMOL/L
HCO3 BLDA-SCNC: 26 MMOL/L
HCT VFR BLD AUTO: 36.4 % (ref 36.6–50.3)
HCT VFR BLD AUTO: 38.6 % (ref 36.6–50.3)
HGB BLD-MCNC: 12 G/DL (ref 12.1–17)
HGB BLD-MCNC: 12.7 G/DL (ref 12.1–17)
INR PPP: 1.2 (ref 0.9–1.1)
LACTATE BLD-SCNC: 0.54 MMOL/L (ref 0.4–2)
LACTATE BLD-SCNC: 0.78 MMOL/L (ref 0.4–2)
LACTATE BLD-SCNC: 0.79 MMOL/L (ref 0.4–2)
LACTATE BLD-SCNC: 0.79 MMOL/L (ref 0.4–2)
LACTATE BLD-SCNC: 1.56 MMOL/L (ref 0.4–2)
LACTATE BLD-SCNC: 2.11 MMOL/L (ref 0.4–2)
LACTATE BLD-SCNC: <0.3 MMOL/L (ref 0.4–2)
LACTATE BLD-SCNC: <0.3 MMOL/L (ref 0.4–2)
MAGNESIUM SERPL-MCNC: 3 MG/DL (ref 1.6–2.4)
MCH RBC QN AUTO: 31.2 PG (ref 26–34)
MCH RBC QN AUTO: 31.4 PG (ref 26–34)
MCHC RBC AUTO-ENTMCNC: 32.9 G/DL (ref 30–36.5)
MCHC RBC AUTO-ENTMCNC: 33 G/DL (ref 30–36.5)
MCV RBC AUTO: 94.8 FL (ref 80–99)
MCV RBC AUTO: 95.3 FL (ref 80–99)
NRBC # BLD: 0 K/UL (ref 0–0.01)
NRBC # BLD: 0 K/UL (ref 0–0.01)
NRBC BLD-RTO: 0 PER 100 WBC
PCO2 BLD: 36.1 MMHG (ref 35–45)
PCO2 BLD: 38 MMHG (ref 35–45)
PCO2 BLD: 39.6 MMHG (ref 35–45)
PCO2 BLD: 40.4 MMHG (ref 35–45)
PCO2 BLD: 45.2 MMHG (ref 35–45)
PCO2 BLD: 50.6 MMHG (ref 35–45)
PCO2 BLDA: 37 MMHG (ref 35–45)
PCO2 BLDV: 37.4 MMHG (ref 41–51)
PCO2 BLDV: 41.2 MMHG (ref 41–51)
PEEP RESPIRATORY: 5
PH BLD: 7.27 (ref 7.35–7.45)
PH BLD: 7.33 (ref 7.35–7.45)
PH BLD: 7.37 (ref 7.35–7.45)
PH BLD: 7.38 (ref 7.35–7.45)
PH BLD: 7.4 (ref 7.35–7.45)
PH BLD: 7.41 (ref 7.35–7.45)
PH BLDA: 7.34 (ref 7.35–7.45)
PH BLDA: 7.35 (ref 7.35–7.45)
PH BLDV: 7.35 (ref 7.32–7.42)
PH BLDV: 7.36 (ref 7.32–7.42)
PLATELET # BLD AUTO: 176 K/UL (ref 150–400)
PLATELET # BLD AUTO: 198 K/UL (ref 150–400)
PMV BLD AUTO: 9.9 FL (ref 8.9–12.9)
PO2 BLD: 199 MMHG (ref 80–100)
PO2 BLD: 211 MMHG (ref 80–100)
PO2 BLD: 381 MMHG (ref 80–100)
PO2 BLD: 387 MMHG (ref 80–100)
PO2 BLD: 404 MMHG (ref 80–100)
PO2 BLD: 456 MMHG (ref 80–100)
PO2 BLDA: 104 MMHG (ref 80–100)
PO2 BLDA: 150 MMHG (ref 80–100)
PO2 BLDV: 293 MMHG (ref 25–40)
PO2 BLDV: 51 MMHG (ref 25–40)
POTASSIUM BLD-SCNC: 3.6 MMOL/L (ref 3.5–5.5)
POTASSIUM BLD-SCNC: 4.2 MMOL/L (ref 3.5–5.5)
POTASSIUM BLD-SCNC: 4.3 MMOL/L (ref 3.5–5.5)
POTASSIUM BLD-SCNC: 4.5 MMOL/L (ref 3.5–5.5)
POTASSIUM BLD-SCNC: 5.6 MMOL/L (ref 3.5–5.5)
POTASSIUM BLD-SCNC: 5.7 MMOL/L (ref 3.5–5.5)
POTASSIUM BLD-SCNC: 5.9 MMOL/L (ref 3.5–5.5)
POTASSIUM BLD-SCNC: 6 MMOL/L (ref 3.5–5.5)
POTASSIUM SERPL-SCNC: 3.7 MMOL/L (ref 3.5–5.1)
POTASSIUM SERPL-SCNC: 4.6 MMOL/L (ref 3.5–5.1)
PRESSURE SUPPORT SETTING VENT: 5
PROT SERPL-MCNC: 5.5 G/DL (ref 6.4–8.2)
PROT SERPL-MCNC: 5.7 G/DL (ref 6.4–8.2)
PROTHROMBIN TIME: 12.3 SEC (ref 9–11.1)
RBC # BLD AUTO: 3.82 M/UL (ref 4.1–5.7)
SAO2 % BLD: 100 %
SAO2 % BLD: 84 %
SAO2 % BLD: 98 % (ref 92–97)
SAO2% DEVICE SAO2% SENSOR NAME: ABNORMAL
SAO2% DEVICE SAO2% SENSOR NAME: ABNORMAL
SERVICE CMNT-IMP: ABNORMAL
SERVICE CMNT-IMP: NORMAL
SERVICE CMNT-IMP: NORMAL
SODIUM BLD-SCNC: 135 MMOL/L (ref 136–145)
SODIUM BLD-SCNC: 137 MMOL/L (ref 136–145)
SODIUM BLD-SCNC: 142 MMOL/L (ref 136–145)
SODIUM BLD-SCNC: 142 MMOL/L (ref 136–145)
SODIUM BLD-SCNC: 143 MMOL/L (ref 136–145)
SODIUM BLD-SCNC: 143 MMOL/L (ref 136–145)
SODIUM SERPL-SCNC: 141 MMOL/L (ref 136–145)
SODIUM SERPL-SCNC: 144 MMOL/L (ref 136–145)
SPECIMEN EXP DATE BLD: NORMAL
SPECIMEN SITE: ABNORMAL
THERAPEUTIC RANGE: NORMAL SECS (ref 58–77)
TSH SERPL DL<=0.05 MIU/L-ACNC: 1.72 UIU/ML (ref 0.45–4.5)
UNIT DIVISION: 0
UNIT DIVISION: 0
VT SETTING VENT: 500
WBC # BLD AUTO: 15.5 K/UL (ref 4.1–11.1)
WBC # BLD AUTO: 17.7 K/UL (ref 4.1–11.1)

## 2024-05-30 PROCEDURE — P9045 ALBUMIN (HUMAN), 5%, 250 ML: HCPCS | Performed by: NURSE ANESTHETIST, CERTIFIED REGISTERED

## 2024-05-30 PROCEDURE — 2100000001 HC CVICU R&B

## 2024-05-30 PROCEDURE — 84295 ASSAY OF SERUM SODIUM: CPT

## 2024-05-30 PROCEDURE — P9045 ALBUMIN (HUMAN), 5%, 250 ML: HCPCS | Performed by: PHYSICIAN ASSISTANT

## 2024-05-30 PROCEDURE — B246ZZ4 ULTRASONOGRAPHY OF RIGHT AND LEFT HEART, TRANSESOPHAGEAL: ICD-10-PCS | Performed by: ANESTHESIOLOGY

## 2024-05-30 PROCEDURE — C1751 CATH, INF, PER/CENT/MIDLINE: HCPCS | Performed by: THORACIC SURGERY (CARDIOTHORACIC VASCULAR SURGERY)

## 2024-05-30 PROCEDURE — 2500000003 HC RX 250 WO HCPCS: Performed by: PHYSICIAN ASSISTANT

## 2024-05-30 PROCEDURE — 33508 ENDOSCOPIC VEIN HARVEST: CPT | Performed by: PHYSICIAN ASSISTANT

## 2024-05-30 PROCEDURE — C1729 CATH, DRAINAGE: HCPCS | Performed by: THORACIC SURGERY (CARDIOTHORACIC VASCULAR SURGERY)

## 2024-05-30 PROCEDURE — 33533 CABG ARTERIAL SINGLE: CPT | Performed by: PHYSICIAN ASSISTANT

## 2024-05-30 PROCEDURE — 2580000003 HC RX 258: Performed by: NURSE ANESTHETIST, CERTIFIED REGISTERED

## 2024-05-30 PROCEDURE — 6360000002 HC RX W HCPCS: Performed by: NURSE ANESTHETIST, CERTIFIED REGISTERED

## 2024-05-30 PROCEDURE — 6370000000 HC RX 637 (ALT 250 FOR IP): Performed by: PHYSICIAN ASSISTANT

## 2024-05-30 PROCEDURE — 82803 BLOOD GASES ANY COMBINATION: CPT

## 2024-05-30 PROCEDURE — 6360000002 HC RX W HCPCS: Performed by: THORACIC SURGERY (CARDIOTHORACIC VASCULAR SURGERY)

## 2024-05-30 PROCEDURE — 80053 COMPREHEN METABOLIC PANEL: CPT

## 2024-05-30 PROCEDURE — 5A1935Z RESPIRATORY VENTILATION, LESS THAN 24 CONSECUTIVE HOURS: ICD-10-PCS | Performed by: THORACIC SURGERY (CARDIOTHORACIC VASCULAR SURGERY)

## 2024-05-30 PROCEDURE — 3600000018 HC SURGERY OHS ADDTL 15MIN: Performed by: THORACIC SURGERY (CARDIOTHORACIC VASCULAR SURGERY)

## 2024-05-30 PROCEDURE — 36415 COLL VENOUS BLD VENIPUNCTURE: CPT

## 2024-05-30 PROCEDURE — 2500000003 HC RX 250 WO HCPCS: Performed by: THORACIC SURGERY (CARDIOTHORACIC VASCULAR SURGERY)

## 2024-05-30 PROCEDURE — 02L70CK OCCLUSION OF LEFT ATRIAL APPENDAGE WITH EXTRALUMINAL DEVICE, OPEN APPROACH: ICD-10-PCS | Performed by: THORACIC SURGERY (CARDIOTHORACIC VASCULAR SURGERY)

## 2024-05-30 PROCEDURE — 3600000008 HC SURGERY OHS BASE: Performed by: THORACIC SURGERY (CARDIOTHORACIC VASCULAR SURGERY)

## 2024-05-30 PROCEDURE — 6360000002 HC RX W HCPCS: Performed by: PHYSICIAN ASSISTANT

## 2024-05-30 PROCEDURE — 82330 ASSAY OF CALCIUM: CPT

## 2024-05-30 PROCEDURE — 3700000000 HC ANESTHESIA ATTENDED CARE: Performed by: THORACIC SURGERY (CARDIOTHORACIC VASCULAR SURGERY)

## 2024-05-30 PROCEDURE — 2580000003 HC RX 258: Performed by: PHYSICIAN ASSISTANT

## 2024-05-30 PROCEDURE — A4216 STERILE WATER/SALINE, 10 ML: HCPCS | Performed by: PHYSICIAN ASSISTANT

## 2024-05-30 PROCEDURE — 33268 EXCL LAA OPN OTH PX ANY METH: CPT | Performed by: PHYSICIAN ASSISTANT

## 2024-05-30 PROCEDURE — 5A1221Z PERFORMANCE OF CARDIAC OUTPUT, CONTINUOUS: ICD-10-PCS | Performed by: THORACIC SURGERY (CARDIOTHORACIC VASCULAR SURGERY)

## 2024-05-30 PROCEDURE — 6370000000 HC RX 637 (ALT 250 FOR IP): Performed by: NURSE ANESTHETIST, CERTIFIED REGISTERED

## 2024-05-30 PROCEDURE — 85027 COMPLETE CBC AUTOMATED: CPT

## 2024-05-30 PROCEDURE — 021209W BYPASS CORONARY ARTERY, THREE ARTERIES FROM AORTA WITH AUTOLOGOUS VENOUS TISSUE, OPEN APPROACH: ICD-10-PCS | Performed by: THORACIC SURGERY (CARDIOTHORACIC VASCULAR SURGERY)

## 2024-05-30 PROCEDURE — 33519 CABG ARTERY-VEIN THREE: CPT | Performed by: PHYSICIAN ASSISTANT

## 2024-05-30 PROCEDURE — 82947 ASSAY GLUCOSE BLOOD QUANT: CPT

## 2024-05-30 PROCEDURE — 2580000003 HC RX 258: Performed by: STUDENT IN AN ORGANIZED HEALTH CARE EDUCATION/TRAINING PROGRAM

## 2024-05-30 PROCEDURE — 6370000000 HC RX 637 (ALT 250 FOR IP): Performed by: NURSE PRACTITIONER

## 2024-05-30 PROCEDURE — 06BP4ZZ EXCISION OF RIGHT SAPHENOUS VEIN, PERCUTANEOUS ENDOSCOPIC APPROACH: ICD-10-PCS | Performed by: THORACIC SURGERY (CARDIOTHORACIC VASCULAR SURGERY)

## 2024-05-30 PROCEDURE — 02100Z9 BYPASS CORONARY ARTERY, ONE ARTERY FROM LEFT INTERNAL MAMMARY, OPEN APPROACH: ICD-10-PCS | Performed by: THORACIC SURGERY (CARDIOTHORACIC VASCULAR SURGERY)

## 2024-05-30 PROCEDURE — 85018 HEMOGLOBIN: CPT

## 2024-05-30 PROCEDURE — 84132 ASSAY OF SERUM POTASSIUM: CPT

## 2024-05-30 PROCEDURE — 83735 ASSAY OF MAGNESIUM: CPT

## 2024-05-30 PROCEDURE — 6360000002 HC RX W HCPCS: Performed by: NURSE PRACTITIONER

## 2024-05-30 PROCEDURE — 2709999900 HC NON-CHARGEABLE SUPPLY: Performed by: THORACIC SURGERY (CARDIOTHORACIC VASCULAR SURGERY)

## 2024-05-30 PROCEDURE — 02100A9 BYPASS CORONARY ARTERY, ONE ARTERY FROM LEFT INTERNAL MAMMARY WITH AUTOLOGOUS ARTERIAL TISSUE, OPEN APPROACH: ICD-10-PCS | Performed by: THORACIC SURGERY (CARDIOTHORACIC VASCULAR SURGERY)

## 2024-05-30 PROCEDURE — 2700000000 HC OXYGEN THERAPY PER DAY

## 2024-05-30 PROCEDURE — C1889 IMPLANT/INSERT DEVICE, NOC: HCPCS | Performed by: THORACIC SURGERY (CARDIOTHORACIC VASCULAR SURGERY)

## 2024-05-30 PROCEDURE — 85730 THROMBOPLASTIN TIME PARTIAL: CPT

## 2024-05-30 PROCEDURE — 2500000003 HC RX 250 WO HCPCS: Performed by: NURSE ANESTHETIST, CERTIFIED REGISTERED

## 2024-05-30 PROCEDURE — 3700000001 HC ADD 15 MINUTES (ANESTHESIA): Performed by: THORACIC SURGERY (CARDIOTHORACIC VASCULAR SURGERY)

## 2024-05-30 PROCEDURE — 82962 GLUCOSE BLOOD TEST: CPT

## 2024-05-30 PROCEDURE — 94002 VENT MGMT INPAT INIT DAY: CPT

## 2024-05-30 PROCEDURE — 2720000010 HC SURG SUPPLY STERILE: Performed by: THORACIC SURGERY (CARDIOTHORACIC VASCULAR SURGERY)

## 2024-05-30 PROCEDURE — 2580000003 HC RX 258: Performed by: NURSE PRACTITIONER

## 2024-05-30 PROCEDURE — 85610 PROTHROMBIN TIME: CPT

## 2024-05-30 PROCEDURE — 71045 X-RAY EXAM CHEST 1 VIEW: CPT

## 2024-05-30 PROCEDURE — C1713 ANCHOR/SCREW BN/BN,TIS/BN: HCPCS | Performed by: THORACIC SURGERY (CARDIOTHORACIC VASCULAR SURGERY)

## 2024-05-30 PROCEDURE — 37799 UNLISTED PX VASCULAR SURGERY: CPT

## 2024-05-30 DEVICE — DEVICE OCCL CLP L45MM SM FOOTPRINT 1 HND APPL SUTURELESS W/: Type: IMPLANTABLE DEVICE | Site: HEART | Status: FUNCTIONAL

## 2024-05-30 RX ORDER — PAPAVERINE HYDROCHLORIDE 30 MG/ML
INJECTION INTRAMUSCULAR; INTRAVENOUS PRN
Status: DISCONTINUED | OUTPATIENT
Start: 2024-05-30 | End: 2024-05-30 | Stop reason: ALTCHOICE

## 2024-05-30 RX ORDER — MAGNESIUM SULFATE IN WATER 40 MG/ML
2000 INJECTION, SOLUTION INTRAVENOUS PRN
Status: DISCONTINUED | OUTPATIENT
Start: 2024-05-30 | End: 2024-05-31

## 2024-05-30 RX ORDER — OXYCODONE HYDROCHLORIDE 5 MG/1
5 TABLET ORAL EVERY 4 HOURS PRN
Status: DISCONTINUED | OUTPATIENT
Start: 2024-05-30 | End: 2024-06-04 | Stop reason: HOSPADM

## 2024-05-30 RX ORDER — SENNA AND DOCUSATE SODIUM 50; 8.6 MG/1; MG/1
1 TABLET, FILM COATED ORAL 2 TIMES DAILY
Status: DISCONTINUED | OUTPATIENT
Start: 2024-05-30 | End: 2024-06-04 | Stop reason: HOSPADM

## 2024-05-30 RX ORDER — DIPHENHYDRAMINE HCL 25 MG
25 CAPSULE ORAL NIGHTLY PRN
Status: DISCONTINUED | OUTPATIENT
Start: 2024-05-31 | End: 2024-06-04 | Stop reason: HOSPADM

## 2024-05-30 RX ORDER — MIDAZOLAM HYDROCHLORIDE 1 MG/ML
INJECTION INTRAMUSCULAR; INTRAVENOUS PRN
Status: DISCONTINUED | OUTPATIENT
Start: 2024-05-30 | End: 2024-05-30 | Stop reason: SDUPTHER

## 2024-05-30 RX ORDER — GLUCAGON 1 MG/ML
1 KIT INJECTION PRN
Status: DISCONTINUED | OUTPATIENT
Start: 2024-05-30 | End: 2024-06-04 | Stop reason: HOSPADM

## 2024-05-30 RX ORDER — SODIUM CHLORIDE 450 MG/100ML
INJECTION, SOLUTION INTRAVENOUS CONTINUOUS
Status: DISCONTINUED | OUTPATIENT
Start: 2024-05-30 | End: 2024-05-31

## 2024-05-30 RX ORDER — DEXMEDETOMIDINE HYDROCHLORIDE 4 UG/ML
.1-1.5 INJECTION, SOLUTION INTRAVENOUS CONTINUOUS
Status: DISCONTINUED | OUTPATIENT
Start: 2024-05-30 | End: 2024-05-31

## 2024-05-30 RX ORDER — CHLORHEXIDINE GLUCONATE ORAL RINSE 1.2 MG/ML
15 SOLUTION DENTAL 2 TIMES DAILY
Status: DISCONTINUED | OUTPATIENT
Start: 2024-05-30 | End: 2024-06-04 | Stop reason: HOSPADM

## 2024-05-30 RX ORDER — INSULIN LISPRO 100 [IU]/ML
1-20 INJECTION, SOLUTION INTRAVENOUS; SUBCUTANEOUS
Status: DISCONTINUED | OUTPATIENT
Start: 2024-05-30 | End: 2024-06-01 | Stop reason: ALTCHOICE

## 2024-05-30 RX ORDER — GABAPENTIN 100 MG/1
200 CAPSULE ORAL 3 TIMES DAILY
Status: DISCONTINUED | OUTPATIENT
Start: 2024-05-30 | End: 2024-06-04 | Stop reason: HOSPADM

## 2024-05-30 RX ORDER — ACETAMINOPHEN 325 MG/1
975 TABLET ORAL EVERY 6 HOURS SCHEDULED
Status: DISCONTINUED | OUTPATIENT
Start: 2024-05-30 | End: 2024-06-04 | Stop reason: HOSPADM

## 2024-05-30 RX ORDER — SODIUM CHLORIDE 0.9 % (FLUSH) 0.9 %
5-40 SYRINGE (ML) INJECTION EVERY 12 HOURS SCHEDULED
Status: DISCONTINUED | OUTPATIENT
Start: 2024-05-30 | End: 2024-06-04 | Stop reason: HOSPADM

## 2024-05-30 RX ORDER — HYDROMORPHONE HYDROCHLORIDE 1 MG/ML
0.5 INJECTION, SOLUTION INTRAMUSCULAR; INTRAVENOUS; SUBCUTANEOUS EVERY 4 HOURS PRN
Status: DISCONTINUED | OUTPATIENT
Start: 2024-05-30 | End: 2024-06-04 | Stop reason: HOSPADM

## 2024-05-30 RX ORDER — BUPIVACAINE HYDROCHLORIDE 2.5 MG/ML
INJECTION, SOLUTION EPIDURAL; INFILTRATION; INTRACAUDAL PRN
Status: DISCONTINUED | OUTPATIENT
Start: 2024-05-30 | End: 2024-05-30 | Stop reason: ALTCHOICE

## 2024-05-30 RX ORDER — SODIUM CHLORIDE 9 MG/ML
INJECTION, SOLUTION INTRAVENOUS CONTINUOUS
Status: DISCONTINUED | OUTPATIENT
Start: 2024-05-30 | End: 2024-06-04 | Stop reason: HOSPADM

## 2024-05-30 RX ORDER — INSULIN LISPRO 100 [IU]/ML
0-12 INJECTION, SOLUTION INTRAVENOUS; SUBCUTANEOUS
Status: DISCONTINUED | OUTPATIENT
Start: 2024-06-01 | End: 2024-06-02 | Stop reason: ALTCHOICE

## 2024-05-30 RX ORDER — ACETAMINOPHEN 500 MG
1000 TABLET ORAL ONCE
Status: COMPLETED | OUTPATIENT
Start: 2024-05-30 | End: 2024-05-30

## 2024-05-30 RX ORDER — FENTANYL CITRATE 50 UG/ML
INJECTION, SOLUTION INTRAMUSCULAR; INTRAVENOUS PRN
Status: DISCONTINUED | OUTPATIENT
Start: 2024-05-30 | End: 2024-05-30 | Stop reason: SDUPTHER

## 2024-05-30 RX ORDER — LIDOCAINE 4 G/G
2 PATCH TOPICAL DAILY
Status: DISCONTINUED | OUTPATIENT
Start: 2024-05-30 | End: 2024-06-04 | Stop reason: HOSPADM

## 2024-05-30 RX ORDER — ONDANSETRON 2 MG/ML
4 INJECTION INTRAMUSCULAR; INTRAVENOUS EVERY 4 HOURS PRN
Status: DISCONTINUED | OUTPATIENT
Start: 2024-05-30 | End: 2024-06-04 | Stop reason: HOSPADM

## 2024-05-30 RX ORDER — OXYCODONE HYDROCHLORIDE 5 MG/1
10 TABLET ORAL EVERY 4 HOURS PRN
Status: DISCONTINUED | OUTPATIENT
Start: 2024-05-30 | End: 2024-06-04 | Stop reason: HOSPADM

## 2024-05-30 RX ORDER — HEPARIN SODIUM 1000 [USP'U]/ML
INJECTION, SOLUTION INTRAVENOUS; SUBCUTANEOUS PRN
Status: DISCONTINUED | OUTPATIENT
Start: 2024-05-30 | End: 2024-05-30 | Stop reason: SDUPTHER

## 2024-05-30 RX ORDER — PROPOFOL 10 MG/ML
INJECTION, EMULSION INTRAVENOUS PRN
Status: DISCONTINUED | OUTPATIENT
Start: 2024-05-30 | End: 2024-05-30 | Stop reason: SDUPTHER

## 2024-05-30 RX ORDER — PROTAMINE SULFATE 10 MG/ML
INJECTION, SOLUTION INTRAVENOUS PRN
Status: DISCONTINUED | OUTPATIENT
Start: 2024-05-30 | End: 2024-05-30 | Stop reason: SDUPTHER

## 2024-05-30 RX ORDER — DEXTROSE MONOHYDRATE 100 MG/ML
INJECTION, SOLUTION INTRAVENOUS CONTINUOUS PRN
Status: DISCONTINUED | OUTPATIENT
Start: 2024-05-30 | End: 2024-06-04 | Stop reason: HOSPADM

## 2024-05-30 RX ORDER — POTASSIUM CHLORIDE 29.8 MG/ML
20 INJECTION INTRAVENOUS PRN
Status: DISCONTINUED | OUTPATIENT
Start: 2024-05-30 | End: 2024-05-31

## 2024-05-30 RX ORDER — ROCURONIUM BROMIDE 10 MG/ML
INJECTION, SOLUTION INTRAVENOUS PRN
Status: DISCONTINUED | OUTPATIENT
Start: 2024-05-30 | End: 2024-05-30 | Stop reason: SDUPTHER

## 2024-05-30 RX ORDER — POLYETHYLENE GLYCOL 3350 17 G/17G
17 POWDER, FOR SOLUTION ORAL DAILY
Status: DISCONTINUED | OUTPATIENT
Start: 2024-05-30 | End: 2024-06-04 | Stop reason: HOSPADM

## 2024-05-30 RX ORDER — MIDAZOLAM HYDROCHLORIDE 2 MG/2ML
1 INJECTION, SOLUTION INTRAMUSCULAR; INTRAVENOUS
Status: DISCONTINUED | OUTPATIENT
Start: 2024-05-30 | End: 2024-06-04 | Stop reason: HOSPADM

## 2024-05-30 RX ORDER — LANOLIN ALCOHOL/MO/W.PET/CERES
400 CREAM (GRAM) TOPICAL 2 TIMES DAILY
Status: DISCONTINUED | OUTPATIENT
Start: 2024-05-31 | End: 2024-06-04 | Stop reason: HOSPADM

## 2024-05-30 RX ORDER — ALBUMIN, HUMAN INJ 5% 5 %
12.5 SOLUTION INTRAVENOUS PRN
Status: DISCONTINUED | OUTPATIENT
Start: 2024-05-30 | End: 2024-06-04 | Stop reason: HOSPADM

## 2024-05-30 RX ORDER — ONDANSETRON 2 MG/ML
4 INJECTION INTRAMUSCULAR; INTRAVENOUS ONCE
Status: COMPLETED | OUTPATIENT
Start: 2024-05-30 | End: 2024-05-30

## 2024-05-30 RX ORDER — SODIUM CHLORIDE 0.9 % (FLUSH) 0.9 %
5-40 SYRINGE (ML) INJECTION PRN
Status: DISCONTINUED | OUTPATIENT
Start: 2024-05-30 | End: 2024-06-04 | Stop reason: HOSPADM

## 2024-05-30 RX ORDER — PHENYLEPHRINE HCL IN 0.9% NACL 0.4MG/10ML
SYRINGE (ML) INTRAVENOUS PRN
Status: DISCONTINUED | OUTPATIENT
Start: 2024-05-30 | End: 2024-05-30 | Stop reason: SDUPTHER

## 2024-05-30 RX ORDER — LANOLIN ALCOHOL/MO/W.PET/CERES
3 CREAM (GRAM) TOPICAL NIGHTLY PRN
Status: DISCONTINUED | OUTPATIENT
Start: 2024-05-30 | End: 2024-06-04 | Stop reason: HOSPADM

## 2024-05-30 RX ORDER — ONDANSETRON 2 MG/ML
INJECTION INTRAMUSCULAR; INTRAVENOUS PRN
Status: DISCONTINUED | OUTPATIENT
Start: 2024-05-30 | End: 2024-05-30 | Stop reason: SDUPTHER

## 2024-05-30 RX ORDER — AMIODARONE HYDROCHLORIDE 200 MG/1
400 TABLET ORAL 2 TIMES DAILY
Status: DISCONTINUED | OUTPATIENT
Start: 2024-05-31 | End: 2024-06-04 | Stop reason: HOSPADM

## 2024-05-30 RX ORDER — IPRATROPIUM BROMIDE AND ALBUTEROL SULFATE 2.5; .5 MG/3ML; MG/3ML
1 SOLUTION RESPIRATORY (INHALATION) EVERY 4 HOURS PRN
Status: DISCONTINUED | OUTPATIENT
Start: 2024-05-30 | End: 2024-06-04 | Stop reason: HOSPADM

## 2024-05-30 RX ORDER — BISACODYL 10 MG
10 SUPPOSITORY, RECTAL RECTAL DAILY PRN
Status: DISCONTINUED | OUTPATIENT
Start: 2024-05-30 | End: 2024-06-04 | Stop reason: HOSPADM

## 2024-05-30 RX ORDER — SODIUM CHLORIDE, SODIUM LACTATE, POTASSIUM CHLORIDE, CALCIUM CHLORIDE 600; 310; 30; 20 MG/100ML; MG/100ML; MG/100ML; MG/100ML
INJECTION, SOLUTION INTRAVENOUS CONTINUOUS PRN
Status: DISCONTINUED | OUTPATIENT
Start: 2024-05-30 | End: 2024-05-30 | Stop reason: SDUPTHER

## 2024-05-30 RX ORDER — INSULIN GLARGINE 100 [IU]/ML
1-50 INJECTION, SOLUTION SUBCUTANEOUS
Status: ACTIVE | OUTPATIENT
Start: 2024-06-01 | End: 2024-06-02

## 2024-05-30 RX ORDER — LIDOCAINE HYDROCHLORIDE 20 MG/ML
INJECTION, SOLUTION EPIDURAL; INFILTRATION; INTRACAUDAL; PERINEURAL PRN
Status: DISCONTINUED | OUTPATIENT
Start: 2024-05-30 | End: 2024-05-30 | Stop reason: SDUPTHER

## 2024-05-30 RX ORDER — CEFAZOLIN SODIUM 1 G/3ML
INJECTION, POWDER, FOR SOLUTION INTRAMUSCULAR; INTRAVENOUS PRN
Status: DISCONTINUED | OUTPATIENT
Start: 2024-05-30 | End: 2024-05-30 | Stop reason: ALTCHOICE

## 2024-05-30 RX ORDER — SODIUM CHLORIDE 9 MG/ML
INJECTION, SOLUTION INTRAVENOUS CONTINUOUS PRN
Status: DISCONTINUED | OUTPATIENT
Start: 2024-05-30 | End: 2024-05-30 | Stop reason: SDUPTHER

## 2024-05-30 RX ORDER — HYDRALAZINE HYDROCHLORIDE 20 MG/ML
10 INJECTION INTRAMUSCULAR; INTRAVENOUS EVERY 6 HOURS PRN
Status: DISCONTINUED | OUTPATIENT
Start: 2024-05-30 | End: 2024-06-04 | Stop reason: HOSPADM

## 2024-05-30 RX ORDER — ALBUMIN, HUMAN INJ 5% 5 %
SOLUTION INTRAVENOUS PRN
Status: DISCONTINUED | OUTPATIENT
Start: 2024-05-30 | End: 2024-05-30 | Stop reason: SDUPTHER

## 2024-05-30 RX ORDER — MAGNESIUM SULFATE HEPTAHYDRATE 40 MG/ML
INJECTION, SOLUTION INTRAVENOUS PRN
Status: DISCONTINUED | OUTPATIENT
Start: 2024-05-30 | End: 2024-05-30 | Stop reason: SDUPTHER

## 2024-05-30 RX ORDER — INSULIN LISPRO 100 [IU]/ML
0-6 INJECTION, SOLUTION INTRAVENOUS; SUBCUTANEOUS NIGHTLY
Status: DISCONTINUED | OUTPATIENT
Start: 2024-06-01 | End: 2024-06-02 | Stop reason: ALTCHOICE

## 2024-05-30 RX ORDER — FAMOTIDINE 20 MG/1
20 TABLET, FILM COATED ORAL 2 TIMES DAILY
Status: COMPLETED | OUTPATIENT
Start: 2024-05-30 | End: 2024-06-03

## 2024-05-30 RX ORDER — GABAPENTIN 300 MG/1
300 CAPSULE ORAL ONCE
Status: COMPLETED | OUTPATIENT
Start: 2024-05-30 | End: 2024-05-30

## 2024-05-30 RX ORDER — NITROGLYCERIN 20 MG/100ML
INJECTION INTRAVENOUS PRN
Status: DISCONTINUED | OUTPATIENT
Start: 2024-05-30 | End: 2024-05-30 | Stop reason: SDUPTHER

## 2024-05-30 RX ORDER — ALBUMIN, HUMAN INJ 5% 5 %
SOLUTION INTRAVENOUS
Status: DISPENSED
Start: 2024-05-30 | End: 2024-05-31

## 2024-05-30 RX ORDER — DEXAMETHASONE SODIUM PHOSPHATE 4 MG/ML
INJECTION, SOLUTION INTRA-ARTICULAR; INTRALESIONAL; INTRAMUSCULAR; INTRAVENOUS; SOFT TISSUE PRN
Status: DISCONTINUED | OUTPATIENT
Start: 2024-05-30 | End: 2024-05-30 | Stop reason: SDUPTHER

## 2024-05-30 RX ADMIN — PROPOFOL 50 MG: 10 INJECTION, EMULSION INTRAVENOUS at 09:26

## 2024-05-30 RX ADMIN — SODIUM CHLORIDE: 4.5 INJECTION, SOLUTION INTRAVENOUS at 14:21

## 2024-05-30 RX ADMIN — FENTANYL CITRATE 100 MCG: 50 INJECTION, SOLUTION INTRAMUSCULAR; INTRAVENOUS at 12:34

## 2024-05-30 RX ADMIN — PROPOFOL 100 MG: 10 INJECTION, EMULSION INTRAVENOUS at 08:37

## 2024-05-30 RX ADMIN — GABAPENTIN 300 MG: 300 CAPSULE ORAL at 07:05

## 2024-05-30 RX ADMIN — SODIUM CHLORIDE: 9 INJECTION, SOLUTION INTRAVENOUS at 08:32

## 2024-05-30 RX ADMIN — PROPOFOL 50 MG: 10 INJECTION, EMULSION INTRAVENOUS at 09:34

## 2024-05-30 RX ADMIN — NITROGLYCERIN 20 MCG: 200 INJECTION, SOLUTION INTRAVENOUS at 09:15

## 2024-05-30 RX ADMIN — ALBUMIN (HUMAN) 12.5 G: 12.5 INJECTION, SOLUTION INTRAVENOUS at 13:13

## 2024-05-30 RX ADMIN — GABAPENTIN 200 MG: 100 CAPSULE ORAL at 20:30

## 2024-05-30 RX ADMIN — MIDAZOLAM HYDROCHLORIDE 4 MG: 1 INJECTION, SOLUTION INTRAMUSCULAR; INTRAVENOUS at 07:28

## 2024-05-30 RX ADMIN — ROCURONIUM BROMIDE 30 MG: 10 INJECTION INTRAVENOUS at 11:07

## 2024-05-30 RX ADMIN — LIDOCAINE HYDROCHLORIDE 100 MG: 20 INJECTION, SOLUTION EPIDURAL; INFILTRATION; INTRACAUDAL; PERINEURAL at 08:37

## 2024-05-30 RX ADMIN — ATORVASTATIN CALCIUM 40 MG: 40 TABLET, FILM COATED ORAL at 20:20

## 2024-05-30 RX ADMIN — ASPIRIN 81 MG CHEWABLE TABLET 81 MG: 81 TABLET CHEWABLE at 20:13

## 2024-05-30 RX ADMIN — NITROGLYCERIN 40 MCG: 200 INJECTION, SOLUTION INTRAVENOUS at 09:14

## 2024-05-30 RX ADMIN — ALBUMIN (HUMAN) 12.5 G: 12.5 INJECTION, SOLUTION INTRAVENOUS at 13:37

## 2024-05-30 RX ADMIN — FENTANYL CITRATE 225 MCG: 50 INJECTION, SOLUTION INTRAMUSCULAR; INTRAVENOUS at 08:37

## 2024-05-30 RX ADMIN — POTASSIUM CHLORIDE 20 MEQ: 29.8 INJECTION, SOLUTION INTRAVENOUS at 16:32

## 2024-05-30 RX ADMIN — SODIUM CHLORIDE: 9 INJECTION, SOLUTION INTRAVENOUS at 15:37

## 2024-05-30 RX ADMIN — GABAPENTIN 200 MG: 100 CAPSULE ORAL at 16:02

## 2024-05-30 RX ADMIN — WATER 2000 MG: 1 INJECTION INTRAMUSCULAR; INTRAVENOUS; SUBCUTANEOUS at 12:45

## 2024-05-30 RX ADMIN — Medication 40 MCG: at 08:42

## 2024-05-30 RX ADMIN — Medication 80 MCG: at 12:14

## 2024-05-30 RX ADMIN — AMINOCAPROIC ACID 10 G/HR: 250 INJECTION, SOLUTION INTRAVENOUS at 08:32

## 2024-05-30 RX ADMIN — FENTANYL CITRATE 100 MCG: 50 INJECTION, SOLUTION INTRAMUSCULAR; INTRAVENOUS at 10:41

## 2024-05-30 RX ADMIN — DEXMEDETOMIDINE 0.3 MCG/KG/HR: 100 INJECTION, SOLUTION INTRAVENOUS at 08:32

## 2024-05-30 RX ADMIN — SODIUM CHLORIDE, POTASSIUM CHLORIDE, SODIUM LACTATE AND CALCIUM CHLORIDE: 600; 310; 30; 20 INJECTION, SOLUTION INTRAVENOUS at 08:26

## 2024-05-30 RX ADMIN — FAMOTIDINE 20 MG: 10 INJECTION, SOLUTION INTRAVENOUS at 16:03

## 2024-05-30 RX ADMIN — NITROGLYCERIN 20 MCG: 200 INJECTION, SOLUTION INTRAVENOUS at 09:12

## 2024-05-30 RX ADMIN — ROCURONIUM BROMIDE 100 MG: 10 INJECTION INTRAVENOUS at 08:37

## 2024-05-30 RX ADMIN — FENTANYL CITRATE 100 MCG: 50 INJECTION, SOLUTION INTRAMUSCULAR; INTRAVENOUS at 12:03

## 2024-05-30 RX ADMIN — DEXAMETHASONE SODIUM PHOSPHATE 8 MG: 4 INJECTION INTRA-ARTICULAR; INTRALESIONAL; INTRAMUSCULAR; INTRAVENOUS; SOFT TISSUE at 08:48

## 2024-05-30 RX ADMIN — SODIUM CHLORIDE 2.3 UNITS/HR: 9 INJECTION, SOLUTION INTRAVENOUS at 10:49

## 2024-05-30 RX ADMIN — DEXTROSE MONOHYDRATE 125 ML: 100 INJECTION, SOLUTION INTRAVENOUS at 14:44

## 2024-05-30 RX ADMIN — ACETAMINOPHEN 975 MG: 325 TABLET ORAL at 16:02

## 2024-05-30 RX ADMIN — ONDANSETRON 4 MG: 2 INJECTION INTRAMUSCULAR; INTRAVENOUS at 12:38

## 2024-05-30 RX ADMIN — WATER 2000 MG: 1 INJECTION INTRAMUSCULAR; INTRAVENOUS; SUBCUTANEOUS at 08:45

## 2024-05-30 RX ADMIN — MAGNESIUM SULFATE HEPTAHYDRATE 2000 MG: 40 INJECTION, SOLUTION INTRAVENOUS at 12:22

## 2024-05-30 RX ADMIN — OXYCODONE HYDROCHLORIDE 5 MG: 5 TABLET ORAL at 21:37

## 2024-05-30 RX ADMIN — MUPIROCIN: 20 OINTMENT TOPICAL at 20:16

## 2024-05-30 RX ADMIN — PROTAMINE SULFATE 260 MG: 10 INJECTION, SOLUTION INTRAVENOUS at 12:06

## 2024-05-30 RX ADMIN — ONDANSETRON 4 MG: 2 INJECTION INTRAMUSCULAR; INTRAVENOUS at 16:54

## 2024-05-30 RX ADMIN — ACETAMINOPHEN 1000 MG: 500 TABLET ORAL at 07:05

## 2024-05-30 RX ADMIN — 0.12% CHLORHEXIDINE GLUCONATE 15 ML: 1.2 RINSE ORAL at 16:43

## 2024-05-30 RX ADMIN — NITROGLYCERIN 40 MCG: 200 INJECTION, SOLUTION INTRAVENOUS at 09:28

## 2024-05-30 RX ADMIN — HEPARIN SODIUM 32000 UNITS: 1000 INJECTION INTRAVENOUS; SUBCUTANEOUS at 10:04

## 2024-05-30 RX ADMIN — FENTANYL CITRATE 100 MCG: 50 INJECTION, SOLUTION INTRAMUSCULAR; INTRAVENOUS at 09:08

## 2024-05-30 RX ADMIN — Medication 100 MCG: at 08:40

## 2024-05-30 RX ADMIN — MIDAZOLAM HYDROCHLORIDE 1 MG: 1 INJECTION, SOLUTION INTRAMUSCULAR; INTRAVENOUS at 08:37

## 2024-05-30 RX ADMIN — SODIUM CHLORIDE, PRESERVATIVE FREE 10 ML: 5 INJECTION INTRAVENOUS at 20:16

## 2024-05-30 RX ADMIN — FENTANYL CITRATE 100 MCG: 50 INJECTION, SOLUTION INTRAMUSCULAR; INTRAVENOUS at 09:09

## 2024-05-30 RX ADMIN — ROCURONIUM BROMIDE 20 MG: 10 INJECTION INTRAVENOUS at 10:06

## 2024-05-30 RX ADMIN — FENTANYL CITRATE 25 MCG: 50 INJECTION, SOLUTION INTRAMUSCULAR; INTRAVENOUS at 07:28

## 2024-05-30 RX ADMIN — NITROGLYCERIN 40 MCG: 200 INJECTION, SOLUTION INTRAVENOUS at 10:17

## 2024-05-30 RX ADMIN — 0.12% CHLORHEXIDINE GLUCONATE 15 ML: 1.2 RINSE ORAL at 19:59

## 2024-05-30 RX ADMIN — ACETAMINOPHEN 975 MG: 325 TABLET ORAL at 23:36

## 2024-05-30 RX ADMIN — MUPIROCIN: 20 OINTMENT TOPICAL at 16:03

## 2024-05-30 RX ADMIN — Medication 120 MCG: at 09:44

## 2024-05-30 RX ADMIN — SENNOSIDES AND DOCUSATE SODIUM 1 TABLET: 50; 8.6 TABLET ORAL at 20:13

## 2024-05-30 RX ADMIN — Medication 100 MCG: at 08:37

## 2024-05-30 RX ADMIN — Medication 80 MCG: at 12:06

## 2024-05-30 RX ADMIN — PHENYLEPHRINE HYDROCHLORIDE 40 MCG/MIN: 10 INJECTION INTRAVENOUS at 08:37

## 2024-05-30 RX ADMIN — WATER 2000 MG: 1 INJECTION INTRAMUSCULAR; INTRAVENOUS; SUBCUTANEOUS at 19:59

## 2024-05-30 RX ADMIN — FAMOTIDINE 20 MG: 20 TABLET, FILM COATED ORAL at 20:14

## 2024-05-30 RX ADMIN — ALBUMIN (HUMAN) 12.5 G: 12.5 INJECTION, SOLUTION INTRAVENOUS at 21:35

## 2024-05-30 RX ADMIN — Medication 80 MCG: at 12:09

## 2024-05-30 RX ADMIN — Medication 80 MCG: at 09:46

## 2024-05-30 ASSESSMENT — PAIN DESCRIPTION - ONSET: ONSET: ON-GOING

## 2024-05-30 ASSESSMENT — PAIN DESCRIPTION - ORIENTATION
ORIENTATION: RIGHT;LOWER
ORIENTATION: RIGHT;LOWER
ORIENTATION: RIGHT;ANTERIOR;MID

## 2024-05-30 ASSESSMENT — PAIN SCALES - GENERAL
PAINLEVEL_OUTOF10: 4
PAINLEVEL_OUTOF10: 2
PAINLEVEL_OUTOF10: 1
PAINLEVEL_OUTOF10: 0
PAINLEVEL_OUTOF10: 2
PAINLEVEL_OUTOF10: 0

## 2024-05-30 ASSESSMENT — PAIN DESCRIPTION - DESCRIPTORS
DESCRIPTORS: ACHING;SORE

## 2024-05-30 ASSESSMENT — PAIN DESCRIPTION - LOCATION
LOCATION: INCISION
LOCATION: CHEST
LOCATION: CHEST
LOCATION: CHEST;INCISION
LOCATION: CHEST;INCISION

## 2024-05-30 ASSESSMENT — PAIN DESCRIPTION - PAIN TYPE: TYPE: ACUTE PAIN

## 2024-05-30 ASSESSMENT — PAIN DESCRIPTION - FREQUENCY: FREQUENCY: CONTINUOUS

## 2024-05-30 ASSESSMENT — PAIN - FUNCTIONAL ASSESSMENT
PAIN_FUNCTIONAL_ASSESSMENT: PREVENTS OR INTERFERES SOME ACTIVE ACTIVITIES AND ADLS
PAIN_FUNCTIONAL_ASSESSMENT: PREVENTS OR INTERFERES SOME ACTIVE ACTIVITIES AND ADLS

## 2024-05-30 ASSESSMENT — PULMONARY FUNCTION TESTS: PIF_VALUE: 16

## 2024-05-30 NOTE — PROGRESS NOTES
TRANSFER - IN REPORT:    Verbal report received from SHANICE Melchor on Sukhdeep Andradese  being received from 2219 for ordered procedure      Report consisted of patient's Situation, Background, Assessment and   Recommendations(SBAR).     Information from the following report(s) Nurse Handoff Report, MAR, and Recent Results was reviewed with the receiving nurse.    Opportunity for questions and clarification was provided.      Assessment completed upon patient's arrival to unit and care assumed.

## 2024-05-30 NOTE — ANESTHESIA PROCEDURE NOTES
Arterial Line:    An arterial line was placed using ultrasound guidance and surface landmarks, in the pre-op for the following indication(s): continuous blood pressure monitoring and blood sampling needed.    A 20 gauge (size), 1 and 3/4 inch (length), Arrow (type) catheter was placed, Seldinger technique not used, into the right radial artery, secured by tape and Tegaderm.  Anesthesia type: Local    Events:  patient tolerated procedure well with no complications and EBL < 5mL.5/30/2024 7:28 AM5/30/2024 7:35 AM  Anesthesiologist: Jesús Diaz MD  Resident/CRNA: Yadira Maldonado, APRN - CRNA  Other anesthesia staff: Georgina Vega RN  Performed: Resident/CRNA   Preanesthetic Checklist  Completed: patient identified, IV checked, site marked, risks and benefits discussed, surgical/procedural consents, equipment checked, pre-op evaluation, timeout performed, anesthesia consent given, oxygen available, monitors applied/VS acknowledged, fire risk safety assessment completed and verbalized and blood product R/B/A discussed and consented

## 2024-05-30 NOTE — OP NOTE
than half full;Tamper seal intact;Bag below bladder;Bag not on floor 05/30/24 1107   Status Patent;Draining 05/30/24 1107   Output (mL) 40 mL 05/30/24 1500              Blood Products Administered: None           Specimens: * No specimens in log *         Complications:  None; patient tolerated the procedure well.           Disposition: ICU - intubated and hemodynamically stable.           Condition: stable      Procedure Details    The patient was brought to the OR, placed in a supine position.  Once the general endotracheal anesthesia obtained, a time-out was done.  IV antibiotics given at this time.  The patient was then prepped and draped in a sterile standard fashion.  A right side saphenectomy was performed utilizing endoscopic vein harvest technique.  This is the greater saphenous vein.  This incision was then closed in layers.  A primary sternotomy was then performed and anterior pericardotomy.  The left internal mammary artery is taken down with electrocautery and clips.    The patient was then fully heparinized.  Cannulas were then placed in the aorta and the right atrium and antegrade and retrograde cardioplegia cannulas were placed.  The patient was then placed on cardiopulmonary bypass.  The aorta was then cross-clamped.  The heart was then stopped with warm antegrade cardioplegia followed by cold retrograde cardioplegia and then cold retrograde cardioplegia of 15 minutes. The first anastomosis was performed  to the OM1.  The OM1 is 1.5 mm in size.  It was performed with 7-0 Prolene running suture and end-to-side anastomosis with saphenous vein graft.  The next graft done with the same saphenous vein graft to D1.  It is 1.5 mm in size, this was performed with 7-0 Prolene running suture, side-to-side anastomosis.   The vein graft was cut to fit and tacted to the pericardium with 6-0 Prolene.  Attention was then drawn to the left atrial appendage.  The ligament of Eyal was taken down with

## 2024-05-30 NOTE — PROGRESS NOTES
Occupational Therapy note:    Order acknowledged and chart reviewed. Patient s/p CABG x4 POD 0. Will defer OT consult and follow up with tomorrow per guidelines.    Renate Mcintyre, OTR/L

## 2024-05-30 NOTE — CONSULTS
Name: Sukhdeep Lomeli   : 1945   MRN: [id number]   Date: 2024        No chief complaint on file.        HPI:     Sukhdeep Lomeli is 78 y.o.  male with history of heart block, status post pacemaker, hypertension, hyperlipidemia.  He was recently found to have multivessel coronary artery disease on evaluation of exertional chest pain.  Patient was evaluated by cardiac surgery and underwent CABG x 4 with left atrial appendage ligation today.  Postoperatively he is admitted to CVICU.  Critical care consult is requested for comanagement.  Patient is currently intubated, sedation is weaned off.  Following commands.  Started SBT.        Assessment/Plan:     Post cardiac surgery care: S/P CABG x 4 and left atrial appendage ligation.  - Evaluation of postoperative labs and imaging studies have been performed.   - Keep on full ventilatory support for the time being and wean as tolerated when more awake.   - If unable to wean vent and patient requires increasing vent support will Rx with sedation and analgesia to facilitate vent synchrony.   - HOB elevated to 30 degrees with VAP bundle.   - Hemodynamic management per CT surgery postoperative guidelines.   - ASA and statin for CAD Rx when feasible.   - When hemodynamically tolerated will also consider initiation of beta blockers.   - Insulin for glycemic control.    Shock. Vasoplegic vs cardiogenic.  - Wean pressors and inotropes as able.   - hemodynamic management in collaboration with CT surgery    Ventilator Management.  - Keep on full ventilatory support for the time being and wean as tolerated when more awake.   - If unable to wean vent and patient requires increasing vent support will Rx with sedation and analgesia to facilitate vent synchrony.   - Post-extubation pulmonary toilette with adequate postop pain control.   - Early mobilization with PT/OT as able    Anemia secondary to acute blood loss  - Follow CT output for now.   - Follow CBC   - 
Brief Procedure Note:         Indication:   Chest pain and high risk stress test    Procedure:   Left heart catheterization, coronary angiogram    Complications: None   Blood loss: Minimal   Condition: Stable     Brief procedure Result:   Severe three-vessel coronary artery disease including severe left main bifurcation disease and  of obtuse marginal branch    LVEDP 20 mmHg    Recommendations:       Severe three-vessel coronary artery disease with heavy calcification, distal left main disease involving bifurcation and  of obtuse marginal branch. Disease is not suitable for PCI.     Will consult CT surgery for CABG evaluation.  Possible target would be LAD, diagonal, obtuse marginal branch and RCA and distal PDA.     Will continue aspirin, metoprolol and statin. Will check 2D echocardiogram.    Ongoing heart team discussion.   Full note and recommendations to follow. '         
1g tylenol and 300mg gabapentin morning of surgery (preop holding)   2. HTN: On BB, on lisinopril and HCTZ PTA hold preop  3. HLD: on statin  4. Hx second degree HB st PPM: Paced rhythm  5. Dilated ascending aorta: Seen on TTE report, will get CTA chest to jailene cuenca          Signed By: ASUNCION More NP     May 28, 2024        Addendum    Patient is 78-year-old male history of sick sinus syndrome and pacemaker placement and melanoma.  Ever since the pacemaker is replaced he has noticed anginal symptoms on walking up stairs and walking his dogs.  Cath today showed severe triple-vessel disease with ejection fraction of 40%.  Echo shows questionable enlarged ascending aorta will get CT scan to evaluate.  Plan for CABG on Thursday

## 2024-05-30 NOTE — ANESTHESIA PRE PROCEDURE
Neuro/Psych:   Negative Neuro/Psych ROS              GI/Hepatic/Renal: Neg GI/Hepatic/Renal ROS            Endo/Other: Negative Endo/Other ROS                    Abdominal: normal exam            Vascular: negative vascular ROS.         Other Findings:             Anesthesia Plan      general     ASA 4       Induction: intravenous.  arterial line, central line, BIS, CVP and ESME  MIPS: Postoperative opioids intended, Prophylactic antiemetics administered and Postoperative ventilation.  Anesthetic plan and risks discussed with patient.    Use of blood products discussed with patient whom consented to blood products.    Plan discussed with CRNA.                    Jesús Diaz MD   5/30/2024

## 2024-05-30 NOTE — ANESTHESIA POSTPROCEDURE EVALUATION
Post-Anesthesia Evaluation and Assessment    Patient: Sukhdeep Lomeli MRN: [id number]  SSN: [id number]    YOB: 1945  Age: 78 y.o.  Sex: male      I have evaluated the patient and they are stable and ready for discharge from the PACU.     Cardiovascular Function/Vital Signs  Visit Vitals  /66   Pulse 80   Temp 98.1 °F (36.7 °C) (Oral)   Resp 16   Ht 1.676 m (5' 6\")   Wt 79.4 kg (175 lb)   SpO2 100%   BMI 28.25 kg/m²       Patient is status post General anesthesia for Procedure(s):  ON PUMP CORONARY ARTERY BYPASS GRAFTING TIMES FOUR WITH USE OF LEFT INTERNAL MAMMARY ARTERY, ENDOSCOPIC HARVESTING OF THE RIGHT GREATER SAPHENOUS VEIN (EVH BY BRANDON REYNOLDS), AND LEFT ATRIAL APPENDAGE LIGATION (E.R.A.S.). ESME BY DR. DIAZ..    Nausea/Vomiting: None    Postoperative hydration reviewed and adequate.    Pain:  Managed    Neurological Status:   At baseline    Mental Status, Level of Consciousness: Alert and  oriented to person, place, and time    Pulmonary Status:   Adequate oxygenation and airway patent    Complications related to anesthesia: None    Post-anesthesia assessment completed. No concerns    Signed By: Jesús Diaz MD     May 30, 2024

## 2024-05-30 NOTE — PROGRESS NOTES
Cardiac Surgery Coordinator called the family of Sukhdeep Lomeli, introduced role of the Cardiac Surgery Care Coordinator.  Reviewed plan of care and day of surgery expectations. Provided family with an update from OR.  Encouraged family to verbalize and emotional support given.  Will continue to update throughout the day.    1055 - Called the family of Sukhdeep Lomeli, provided family with update of on by-pass.  Family without questions or concerns at this time.  Will continue to follow for educational and emotional needs.     1140 - Called the family Luz Marina Lomeli, provided family with update of rewarming.  Family without questions or concerns at this time.  Will continue to follow for educational and emotional needs.     1215 - Called the family of Sukhdeep Lomeli, provided family with update of off by-pass.  Family without questions or concerns at this time.  Will continue to follow for educational and emotional needs.

## 2024-05-30 NOTE — BRIEF OP NOTE
BRIEF OP NOTE  Pre-Op Diagnosis: Coronary artery disease [I25.10]    Post-Op Diagnosis: Coronary artery disease [I25.10]      Procedure: Procedure(s):  CABG X 4 LIMA to LAD, RSVG to PDA, Sequential RSVG to OM, Diag  RIGHT ENDOSCOPIC SAPHENOUS VEIN HARVEST  LEFT ATRIAL APPENDAGE LIGATION 45mm Atriclip  ESME BY DR. MEDINA.    Surgeon:  Dr. Netta MD    Assistant(s): Jesús Arora PA-C    Anesthesia: General      Infusions:   Lamonte, Precedex, Insulin    CPB:  94mins    XC:  78mins    EBL: 375cc    Cell Saver: 732cc    Specimens: * No specimens in log *    Drains and pacing wires: 1 bipolar ventricular wire, 2 chest tubes    Wires completed by: WESTON Arora    Sternal incision closed by: Jesús Arora PA-C    Leg incision closed by:  WESTON Arora    Complications: None    Findings: See full operative note.    Implants:   Implant Name Type Inv. Item Serial No.  Lot No. LRB No. Used Action   DEVICE OCCL CLP L45MM SM FOOTPRINT 1 HND APPL SUTURELESS W/ - SNA Cardiovascular occluders DEVICE OCCL CLP L45MM SM FOOTPRINT 1 HND APPL SUTURELESS W/ NA [employer]WD [id number] Left 1 Implanted

## 2024-05-30 NOTE — PERIOP NOTE
0859 - Time Out completed, including but not limited to the following  2g ancef given at 0845 today  Beta blocker given at 2103 last night  2 units RBCs in the room  Cardioplegia in the room  Concern for bypass targets noted by surgeon     1230 - TRANSFER - OUT REPORT:    Verbal report given to Sudha PRASAD on Sukhdeep Lomeli  being transferred to CVICU for routine post-op       Report consisted of patient's Situation, Background, Assessment and   Recommendations(SBAR).     Information from the following report(s) Surgery Report was reviewed with the receiving nurse.           Lines:   Peripheral IV 05/28/24 Left Antecubital (Active)   Site Assessment Clean, dry & intact 05/30/24 0713   Line Status Flushed;Infusing 05/30/24 0713   Line Care Connections checked and tightened 05/30/24 0713   Phlebitis Assessment No symptoms 05/30/24 0713   Infiltration Assessment 0 05/30/24 0713   Alcohol Cap Used Yes 05/30/24 0713   Dressing Status Clean, dry & intact 05/30/24 0713   Dressing Type Transparent;Securing device 05/30/24 0713       Arterial Line 05/30/24 Radial (Active)       Introducer 05/30/24 (Active)        Opportunity for questions and clarification was provided.      Patient transported with:  Monitor, O2 @ 10 lpm, and Registered Nurse  VICENTE and BRANDON Vazquez RN

## 2024-05-30 NOTE — ANESTHESIA PROCEDURE NOTES
Central Venous Line:    A central venous line was placed using ultrasound guidance, in the pre-op for the following indication(s): central venous access and CVP monitoring.5/30/2024 7:50 AM5/30/2024 8:10 AM    Sterility preparation included the following: provider used sterile gloves, gown, hat and mask, hand hygiene performed prior to central venous catheter insertion, sterile gel and probe cover used in ultrasound-guided central venous catheter insertion and maximum sterile barriers used during central venous catheter insertion.    The patient was placed in Trendelenburg position.The right internal jugular vein was prepped.    The site was prepped with Chloraprep.  A 9 Fr (size), 11.5 (length), introducer double lumen was placed.    During the procedure, the following specific steps were taken: target vein identified, needle advanced into vein and blood aspirated and guidewire advanced into vein.    Intravenous verification was obtained by ultrasound, venous blood return and manometry.    Post insertion care included: all ports aspirated, all ports flushed easily, guidewire removed intact, Biopatch applied, line sutured in place and dressing applied.    During the procedure the patient experienced: patient tolerated procedure well with no complications and EBL < 5mL.      Outcomes: uncomplicated and patient tolerated procedure well  Anesthesia type: local..No  Staffing  Performed: Resident/CRNA   Anesthesiologist: Jesús Diaz MD  Resident/CRNA: Yadira Maldonado APRN - CRNA  Other anesthesia staff: Georgina Vega RN  Performed by: Yadira Maldonado APRN - CRNA  Authorized by: Jesús Diaz MD    Preanesthetic Checklist  Completed: patient identified, IV checked, site marked, risks and benefits discussed, surgical/procedural consents, equipment checked, pre-op evaluation, timeout performed, anesthesia consent given, oxygen available, monitors applied/VS acknowledged, fire risk safety assessment

## 2024-05-30 NOTE — PROGRESS NOTES
1358 received patient from OR intubated and sedated. Precedex,insulin, and mendez drips infusing.   2 chest tubes in place to pleurevac.      1415 albumin given for cvp 9.     1430  portable cxr and ecg obtained. All labs sent.    1445 blood sugar 54. Insulin drip stopped and D10%w given per protocol.    1715 extubated as instructed by dr. Smith who is at bedside.  Patient sleepy but arousable, answers questions approopriately.     1730 wife notified of extubation of patient.

## 2024-05-30 NOTE — PROGRESS NOTES
2200. Patient provided 28 oz of Gatorade beverage, patient drank beverage within specific timeframe and tolerated well.    2300. CHG shower completed, gown change, full linen change completed. Room cleaned per protocol.     0400. Patient provided 14 oz of Gatorade beverage, patient drank beverage within specific timeframe and tolerated well.

## 2024-05-30 NOTE — PROGRESS NOTES
PT note:     Orders received and acknowledged. Chart reviewed and noted patient is s/p CABG x 4 POD 0. Per guidelines will follow up tomorrow for PT evaluation.     Nola Vega, PT, DPT

## 2024-05-30 NOTE — ANESTHESIA POSTPROCEDURE EVALUATION
Post-Anesthesia Evaluation and Assessment    Patient: Sukhdeep Lomeli MRN: [id number]  SSN: [id number]    YOB: 1945  Age: 78 y.o.  Sex: male      I have evaluated the patient and they are stable in the ICU.     Cardiovascular Function/Vital Signs  Visit Vitals  /66   Pulse 80   Temp 98.1 °F (36.7 °C) (Oral)   Resp 16   Ht 1.676 m (5' 6\")   Wt 79.4 kg (175 lb)   SpO2 100%   BMI 28.25 kg/m²       Patient is status post General anesthesia for Procedure(s):  ON PUMP CORONARY ARTERY BYPASS GRAFTING TIMES FOUR WITH USE OF LEFT INTERNAL MAMMARY ARTERY, ENDOSCOPIC HARVESTING OF THE RIGHT GREATER SAPHENOUS VEIN (EVH BY BRANDON REYNOLDS), AND LEFT ATRIAL APPENDAGE LIGATION (E.R.A.S.). ESME BY DR. DIAZ..    Nausea/Vomiting: None    Postoperative hydration reviewed and adequate.    Pain:  Managed    Neurological Status:   Intubated and sedated     Pulmonary Status:   Intubated with adequate ventilation and oxygenation     Complications related to anesthesia: None    Post-anesthesia assessment completed. No concerns    Signed By: Jesús Diaz MD     May 30, 2024

## 2024-05-31 ENCOUNTER — APPOINTMENT (OUTPATIENT)
Facility: HOSPITAL | Age: 79
DRG: 233 | End: 2024-05-31
Attending: STUDENT IN AN ORGANIZED HEALTH CARE EDUCATION/TRAINING PROGRAM
Payer: MEDICARE

## 2024-05-31 DIAGNOSIS — Z95.1 S/P CABG (CORONARY ARTERY BYPASS GRAFT): Primary | ICD-10-CM

## 2024-05-31 PROBLEM — R73.9 STRESS HYPERGLYCEMIA: Status: ACTIVE | Noted: 2024-05-31

## 2024-05-31 LAB
ACUTE KIDNEY INJURY RISK NEPHROCHECK: 0.31 (ref 0–0.3)
ANION GAP SERPL CALC-SCNC: 2 MMOL/L (ref 5–15)
BUN SERPL-MCNC: 19 MG/DL (ref 6–20)
BUN/CREAT SERPL: 26 (ref 12–20)
CALCIUM SERPL-MCNC: 7.9 MG/DL (ref 8.5–10.1)
CO2 SERPL-SCNC: 24 MMOL/L (ref 21–32)
CREAT SERPL-MCNC: 0.73 MG/DL (ref 0.7–1.3)
EKG ATRIAL RATE: 61 BPM
EKG ATRIAL RATE: 66 BPM
EKG DIAGNOSIS: NORMAL
EKG DIAGNOSIS: NORMAL
EKG P AXIS: 42 DEGREES
EKG P-R INTERVAL: 184 MS
EKG Q-T INTERVAL: 476 MS
EKG Q-T INTERVAL: 504 MS
EKG QRS DURATION: 146 MS
EKG QRS DURATION: 156 MS
EKG QTC CALCULATION (BAZETT): 499 MS
EKG QTC CALCULATION (BAZETT): 507 MS
EKG R AXIS: -42 DEGREES
EKG R AXIS: 129 DEGREES
EKG T AXIS: -23 DEGREES
EKG T AXIS: 3 DEGREES
EKG VENTRICULAR RATE: 61 BPM
EKG VENTRICULAR RATE: 66 BPM
ERYTHROCYTE [DISTWIDTH] IN BLOOD BY AUTOMATED COUNT: 12.9 % (ref 11.5–14.5)
GLUCOSE BLD STRIP.AUTO-MCNC: 100 MG/DL (ref 65–117)
GLUCOSE BLD STRIP.AUTO-MCNC: 102 MG/DL (ref 65–117)
GLUCOSE BLD STRIP.AUTO-MCNC: 102 MG/DL (ref 65–117)
GLUCOSE BLD STRIP.AUTO-MCNC: 104 MG/DL (ref 65–117)
GLUCOSE BLD STRIP.AUTO-MCNC: 109 MG/DL (ref 65–117)
GLUCOSE BLD STRIP.AUTO-MCNC: 114 MG/DL (ref 65–117)
GLUCOSE BLD STRIP.AUTO-MCNC: 116 MG/DL (ref 65–117)
GLUCOSE BLD STRIP.AUTO-MCNC: 118 MG/DL (ref 65–117)
GLUCOSE BLD STRIP.AUTO-MCNC: 125 MG/DL (ref 65–117)
GLUCOSE BLD STRIP.AUTO-MCNC: 128 MG/DL (ref 65–117)
GLUCOSE BLD STRIP.AUTO-MCNC: 137 MG/DL (ref 65–117)
GLUCOSE BLD STRIP.AUTO-MCNC: 177 MG/DL (ref 65–117)
GLUCOSE BLD STRIP.AUTO-MCNC: 179 MG/DL (ref 65–117)
GLUCOSE BLD STRIP.AUTO-MCNC: 186 MG/DL (ref 65–117)
GLUCOSE BLD STRIP.AUTO-MCNC: 201 MG/DL (ref 65–117)
GLUCOSE BLD STRIP.AUTO-MCNC: 87 MG/DL (ref 65–117)
GLUCOSE BLD STRIP.AUTO-MCNC: 95 MG/DL (ref 65–117)
GLUCOSE BLD STRIP.AUTO-MCNC: 96 MG/DL (ref 65–117)
GLUCOSE BLD STRIP.AUTO-MCNC: 99 MG/DL (ref 65–117)
GLUCOSE SERPL-MCNC: 102 MG/DL (ref 65–100)
HCT VFR BLD AUTO: 32.8 % (ref 36.6–50.3)
HGB BLD-MCNC: 11 G/DL (ref 12.1–17)
MAGNESIUM SERPL-MCNC: 2.6 MG/DL (ref 1.6–2.4)
MCHC RBC AUTO-ENTMCNC: 33.5 G/DL (ref 30–36.5)
MCV RBC AUTO: 94.3 FL (ref 80–99)
NRBC BLD-RTO: 0 PER 100 WBC
PLATELET # BLD AUTO: 159 K/UL (ref 150–400)
PMV BLD AUTO: 10.2 FL (ref 8.9–12.9)
POTASSIUM SERPL-SCNC: 4 MMOL/L (ref 3.5–5.1)
SERVICE CMNT-IMP: ABNORMAL
SERVICE CMNT-IMP: NORMAL
SODIUM SERPL-SCNC: 140 MMOL/L (ref 136–145)
WBC # BLD AUTO: 13.9 K/UL (ref 4.1–11.1)

## 2024-05-31 PROCEDURE — 36415 COLL VENOUS BLD VENIPUNCTURE: CPT

## 2024-05-31 PROCEDURE — 97535 SELF CARE MNGMENT TRAINING: CPT

## 2024-05-31 PROCEDURE — 2580000003 HC RX 258: Performed by: PHYSICIAN ASSISTANT

## 2024-05-31 PROCEDURE — 93005 ELECTROCARDIOGRAM TRACING: CPT | Performed by: PHYSICIAN ASSISTANT

## 2024-05-31 PROCEDURE — 83735 ASSAY OF MAGNESIUM: CPT

## 2024-05-31 PROCEDURE — 2580000003 HC RX 258

## 2024-05-31 PROCEDURE — 99231 SBSQ HOSP IP/OBS SF/LOW 25: CPT | Performed by: CLINICAL NURSE SPECIALIST

## 2024-05-31 PROCEDURE — P9045 ALBUMIN (HUMAN), 5%, 250 ML: HCPCS | Performed by: PHYSICIAN ASSISTANT

## 2024-05-31 PROCEDURE — 97166 OT EVAL MOD COMPLEX 45 MIN: CPT

## 2024-05-31 PROCEDURE — 97116 GAIT TRAINING THERAPY: CPT

## 2024-05-31 PROCEDURE — 80048 BASIC METABOLIC PNL TOTAL CA: CPT

## 2024-05-31 PROCEDURE — 6370000000 HC RX 637 (ALT 250 FOR IP): Performed by: PHYSICIAN ASSISTANT

## 2024-05-31 PROCEDURE — 6360000002 HC RX W HCPCS

## 2024-05-31 PROCEDURE — 97162 PT EVAL MOD COMPLEX 30 MIN: CPT

## 2024-05-31 PROCEDURE — 71045 X-RAY EXAM CHEST 1 VIEW: CPT

## 2024-05-31 PROCEDURE — 97530 THERAPEUTIC ACTIVITIES: CPT

## 2024-05-31 PROCEDURE — 6360000002 HC RX W HCPCS: Performed by: PHYSICIAN ASSISTANT

## 2024-05-31 PROCEDURE — 2700000000 HC OXYGEN THERAPY PER DAY

## 2024-05-31 PROCEDURE — 6360000002 HC RX W HCPCS: Performed by: THORACIC SURGERY (CARDIOTHORACIC VASCULAR SURGERY)

## 2024-05-31 PROCEDURE — P9045 ALBUMIN (HUMAN), 5%, 250 ML: HCPCS

## 2024-05-31 PROCEDURE — 82962 GLUCOSE BLOOD TEST: CPT

## 2024-05-31 PROCEDURE — 85027 COMPLETE CBC AUTOMATED: CPT

## 2024-05-31 PROCEDURE — 2060000000 HC ICU INTERMEDIATE R&B

## 2024-05-31 RX ORDER — POTASSIUM CHLORIDE 7.45 MG/ML
10 INJECTION INTRAVENOUS PRN
Status: DISCONTINUED | OUTPATIENT
Start: 2024-05-31 | End: 2024-06-04 | Stop reason: HOSPADM

## 2024-05-31 RX ORDER — SODIUM CHLORIDE, SODIUM LACTATE, POTASSIUM CHLORIDE, AND CALCIUM CHLORIDE .6; .31; .03; .02 G/100ML; G/100ML; G/100ML; G/100ML
500 INJECTION, SOLUTION INTRAVENOUS ONCE
Status: COMPLETED | OUTPATIENT
Start: 2024-05-31 | End: 2024-05-31

## 2024-05-31 RX ORDER — FUROSEMIDE 10 MG/ML
20 INJECTION INTRAMUSCULAR; INTRAVENOUS ONCE
Status: DISCONTINUED | OUTPATIENT
Start: 2024-05-31 | End: 2024-05-31

## 2024-05-31 RX ORDER — SODIUM CHLORIDE, SODIUM LACTATE, POTASSIUM CHLORIDE, AND CALCIUM CHLORIDE .6; .31; .03; .02 G/100ML; G/100ML; G/100ML; G/100ML
500 INJECTION, SOLUTION INTRAVENOUS ONCE
Status: DISCONTINUED | OUTPATIENT
Start: 2024-05-31 | End: 2024-05-31

## 2024-05-31 RX ORDER — MAGNESIUM SULFATE IN WATER 40 MG/ML
2000 INJECTION, SOLUTION INTRAVENOUS PRN
Status: DISCONTINUED | OUTPATIENT
Start: 2024-05-31 | End: 2024-06-04 | Stop reason: HOSPADM

## 2024-05-31 RX ORDER — ENOXAPARIN SODIUM 100 MG/ML
40 INJECTION SUBCUTANEOUS DAILY
Status: DISCONTINUED | OUTPATIENT
Start: 2024-05-31 | End: 2024-06-04 | Stop reason: HOSPADM

## 2024-05-31 RX ORDER — ALBUMIN, HUMAN INJ 5% 5 %
12.5 SOLUTION INTRAVENOUS ONCE
Status: COMPLETED | OUTPATIENT
Start: 2024-05-31 | End: 2024-05-31

## 2024-05-31 RX ORDER — ALBUMIN, HUMAN INJ 5% 5 %
12.5 SOLUTION INTRAVENOUS ONCE
Status: DISCONTINUED | OUTPATIENT
Start: 2024-05-31 | End: 2024-05-31

## 2024-05-31 RX ORDER — POTASSIUM CHLORIDE 20 MEQ/1
40 TABLET, EXTENDED RELEASE ORAL PRN
Status: DISCONTINUED | OUTPATIENT
Start: 2024-05-31 | End: 2024-06-04 | Stop reason: HOSPADM

## 2024-05-31 RX ADMIN — MUPIROCIN 1 EACH: 20 OINTMENT TOPICAL at 20:49

## 2024-05-31 RX ADMIN — ACETAMINOPHEN 975 MG: 325 TABLET ORAL at 12:47

## 2024-05-31 RX ADMIN — WATER 2000 MG: 1 INJECTION INTRAMUSCULAR; INTRAVENOUS; SUBCUTANEOUS at 04:12

## 2024-05-31 RX ADMIN — SODIUM CHLORIDE, PRESERVATIVE FREE 10 ML: 5 INJECTION INTRAVENOUS at 20:48

## 2024-05-31 RX ADMIN — SENNOSIDES AND DOCUSATE SODIUM 1 TABLET: 50; 8.6 TABLET ORAL at 08:24

## 2024-05-31 RX ADMIN — FAMOTIDINE 20 MG: 20 TABLET, FILM COATED ORAL at 20:48

## 2024-05-31 RX ADMIN — WATER 2000 MG: 1 INJECTION INTRAMUSCULAR; INTRAVENOUS; SUBCUTANEOUS at 20:47

## 2024-05-31 RX ADMIN — ATORVASTATIN CALCIUM 40 MG: 40 TABLET, FILM COATED ORAL at 20:48

## 2024-05-31 RX ADMIN — 0.12% CHLORHEXIDINE GLUCONATE 15 ML: 1.2 RINSE ORAL at 20:47

## 2024-05-31 RX ADMIN — INSULIN LISPRO 5 UNITS: 100 INJECTION, SOLUTION INTRAVENOUS; SUBCUTANEOUS at 17:55

## 2024-05-31 RX ADMIN — ALBUMIN (HUMAN) 12.5 G: 12.5 INJECTION, SOLUTION INTRAVENOUS at 03:42

## 2024-05-31 RX ADMIN — GABAPENTIN 200 MG: 100 CAPSULE ORAL at 08:24

## 2024-05-31 RX ADMIN — POLYETHYLENE GLYCOL 3350 17 G: 17 POWDER, FOR SOLUTION ORAL at 08:25

## 2024-05-31 RX ADMIN — SODIUM CHLORIDE, POTASSIUM CHLORIDE, SODIUM LACTATE AND CALCIUM CHLORIDE 500 ML: 600; 310; 30; 20 INJECTION, SOLUTION INTRAVENOUS at 11:28

## 2024-05-31 RX ADMIN — FAMOTIDINE 20 MG: 20 TABLET, FILM COATED ORAL at 08:24

## 2024-05-31 RX ADMIN — AMIODARONE HYDROCHLORIDE 400 MG: 200 TABLET ORAL at 20:48

## 2024-05-31 RX ADMIN — ASPIRIN 81 MG CHEWABLE TABLET 81 MG: 81 TABLET CHEWABLE at 20:48

## 2024-05-31 RX ADMIN — ENOXAPARIN SODIUM 40 MG: 100 INJECTION SUBCUTANEOUS at 08:24

## 2024-05-31 RX ADMIN — GABAPENTIN 200 MG: 100 CAPSULE ORAL at 20:48

## 2024-05-31 RX ADMIN — MUPIROCIN: 20 OINTMENT TOPICAL at 08:39

## 2024-05-31 RX ADMIN — SENNOSIDES AND DOCUSATE SODIUM 1 TABLET: 50; 8.6 TABLET ORAL at 20:48

## 2024-05-31 RX ADMIN — OXYCODONE HYDROCHLORIDE 5 MG: 5 TABLET ORAL at 04:37

## 2024-05-31 RX ADMIN — ACETAMINOPHEN 975 MG: 325 TABLET ORAL at 05:58

## 2024-05-31 RX ADMIN — AMIODARONE HYDROCHLORIDE 400 MG: 200 TABLET ORAL at 08:24

## 2024-05-31 RX ADMIN — GABAPENTIN 200 MG: 100 CAPSULE ORAL at 14:29

## 2024-05-31 RX ADMIN — Medication 400 MG: at 08:24

## 2024-05-31 RX ADMIN — ALBUMIN (HUMAN) 12.5 G: 12.5 INJECTION, SOLUTION INTRAVENOUS at 14:08

## 2024-05-31 RX ADMIN — 0.12% CHLORHEXIDINE GLUCONATE 15 ML: 1.2 RINSE ORAL at 08:29

## 2024-05-31 RX ADMIN — SODIUM CHLORIDE 6.3 UNITS/HR: 9 INJECTION, SOLUTION INTRAVENOUS at 20:36

## 2024-05-31 RX ADMIN — SODIUM CHLORIDE, PRESERVATIVE FREE 10 ML: 5 INJECTION INTRAVENOUS at 09:00

## 2024-05-31 RX ADMIN — Medication 400 MG: at 20:48

## 2024-05-31 RX ADMIN — WATER 2000 MG: 1 INJECTION INTRAMUSCULAR; INTRAVENOUS; SUBCUTANEOUS at 12:58

## 2024-05-31 RX ADMIN — ACETAMINOPHEN 975 MG: 325 TABLET ORAL at 17:58

## 2024-05-31 ASSESSMENT — PAIN DESCRIPTION - ORIENTATION
ORIENTATION: ANTERIOR;RIGHT
ORIENTATION: ANTERIOR;MID;RIGHT
ORIENTATION: RIGHT;LOWER;ANTERIOR
ORIENTATION: RIGHT;ANTERIOR;LOWER

## 2024-05-31 ASSESSMENT — PAIN SCALES - GENERAL
PAINLEVEL_OUTOF10: 2
PAINLEVEL_OUTOF10: 3
PAINLEVEL_OUTOF10: 1
PAINLEVEL_OUTOF10: 4
PAINLEVEL_OUTOF10: 2
PAINLEVEL_OUTOF10: 2
PAINLEVEL_OUTOF10: 4

## 2024-05-31 ASSESSMENT — PAIN - FUNCTIONAL ASSESSMENT
PAIN_FUNCTIONAL_ASSESSMENT: PREVENTS OR INTERFERES WITH MANY ACTIVE NOT PASSIVE ACTIVITIES
PAIN_FUNCTIONAL_ASSESSMENT: PREVENTS OR INTERFERES SOME ACTIVE ACTIVITIES AND ADLS

## 2024-05-31 ASSESSMENT — PAIN DESCRIPTION - ONSET
ONSET: ON-GOING
ONSET: ON-GOING

## 2024-05-31 ASSESSMENT — PAIN DESCRIPTION - DESCRIPTORS
DESCRIPTORS: ACHING;SORE
DESCRIPTORS: SORE
DESCRIPTORS: ACHING;SORE
DESCRIPTORS: ACHING;SORE

## 2024-05-31 ASSESSMENT — PAIN DESCRIPTION - PAIN TYPE
TYPE: SURGICAL PAIN

## 2024-05-31 ASSESSMENT — PAIN DESCRIPTION - FREQUENCY
FREQUENCY: CONTINUOUS
FREQUENCY: CONTINUOUS

## 2024-05-31 ASSESSMENT — PAIN DESCRIPTION - LOCATION
LOCATION: INCISION
LOCATION: CHEST;INCISION
LOCATION: INCISION;CHEST
LOCATION: INCISION
LOCATION: CHEST;INCISION
LOCATION: CHEST;INCISION
LOCATION: CHEST

## 2024-05-31 NOTE — PROGRESS NOTES
1120 BRANDON Temple, notifed of low urine output and BP 87/48. Will give 500cc LR bolus as ordered.     1140 PT/OT at bedside.   1630 ambulated  entire length of hallway using rolling walker. Tolerated well.  Sitting in recliner.   1640 wife bringing home sternal cleaning supplies with dial soap.     1800  ate 75% dinner tray.

## 2024-05-31 NOTE — PROCEDURES
PROCEDURE NOTE  Date: 5/31/2024   Name: Sukhdeep Lomeli  YOB: 1945    Procedures  External ventricular epicardial wire site cleansed with alcohol. Sutures cut and removed. Traction pulled on ventricular wire and removed without resistance.  Pt tolerated procedure well.

## 2024-05-31 NOTE — DISCHARGE INSTRUCTIONS
incentive spirometer 6-8 times a day-10 breaths each time.  Use a pillow or your bear to splint your breastbone when coughing or sneezing.  If you feel your breast bone clicking or popping, notify the office immediately.   Walk several hundred feet several times daily.    DIET  Eat an American Heart Association diet.  If you are having trouble with your appetite, eat what you can.  Try eating small, frequent meals throughout the day.     Diabetic patients should follow an ADA diet. Check your blood sugars  And keep a log of your results.    ACTIVITY  NO DRIVING--you will be evaluated to drive at your follow up visit.  Increase your activity by walking several times a day.  Stay out of bed most of the day.  When sitting, keep your legs elevated.  You may ride in a car, but you must get out every hour and walk around.  If you ride in a car with an airbag that can not be switched off, put the seat ALL the way back or ride in the back seat.  Any load bearing activity can be performed as long as it can be performed \"in the tube\".  You can reach \"out of the tube\" when performing activities that don't require heavy lifting.  Let pain be your guide, your pain level will keep you from doing anything extreme.      Normal Problems After Discharge:  Some fatigue and difficulty sleeping  Poor appetite initially, with temporary change in taste  Temperature variability; feeling hot and cold  Chest wall soreness and paresthesia (numbness)  Some musculoskeletal pain along joint lines in chest and back.   Tylenol or NSAIDs will help unless contraindicated    Patients with EVH (Endoscopic Vein [place]) will have mild swelling in that leg due to temporary venous insufficiency  Elevation & compression stockings will help to reduce the swelling        FOLLOW UP  Your first follow up appointment will be on 6/11/24 at 10:30am. Our office is located in [facility] [address], [address]. Your second follow up appointment will be  on

## 2024-05-31 NOTE — DIABETES MGMT
[facility]  DIABETES MANAGEMENT CONSULT    Consulted by  Jesús Arora PA-C  for advanced nursing evaluation and care for inpatient blood glucose management.    Evaluation and Action Plan   Sukhdeep Lomeli is a 78 year old gentleman with with no history of diabetes admitted with multivessel CAD now s/p CABG x4.  Admitting A1C normal at 5.5%. Blood glucose now well controlled on insulin infusion via post-operative protocol for stress hyperglycemia.  There is low suspicion of hyperglycemia once transitioning from the insulin infusion.      Action Plan    1. Insulin infusion per post-operative protocol.   2. Do give the ordered SQ humalog with meals while on insulin gtt.            Initial Presentation   Sukhdeep Lomeli is a 78 y.o. male admitted 5/28/24 after a cardiac cath revealed CAD.    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   5/28: Admission   5/30: CABG x4  Diabetes History   No previous history of diabetes     Subjective   ”I am tired.”     Objective   Physical exam  General Overweight male in post-op pain. Conversant and cooperative  Neuro  Alert, oriented   Vital Signs   Vitals:    05/31/24 1500   BP: (!) 111/52   Pulse: 70   Resp: 21   Temp:    SpO2: 94%     Skin  Warm and dry. No acanthosis noted along neckline. No lipohypertrophy or lipoatrophy noted at injection sites     Laboratory  Recent Labs     05/30/24  1419 05/30/24  1807 05/31/24  0206   WBC 17.7* 15.5* 13.9*   HGB 12.7 12.0* 11.0*   HCT 38.6 36.4* 32.8*   MCV 94.8 95.3 94.3    176 159     Recent Labs     05/30/24  1419 05/30/24  1807 05/31/24  0206    141 140   K 3.7 4.6 4.0   * 115* 114*   CO2 23 23 24   BUN 16 18 19   CREATININE

## 2024-05-31 NOTE — PROGRESS NOTES
Progress Note      5/29/2024 10:10 AM  NAME: Sukhdeep Lomeli   MRN:  [id number]   Admit Diagnosis: Abnormal ballistocardiogram [R94.39]           Assessment:     1.  CAD and multivessel disease involving LM disease and  of OM   S/p Coronary artery bypass grafting surgery x 4, on pump, utilizing LIMA to distal mid LAD, saphenous vein graft to Diagonal and OM1 in sequence, saphenous vein graft to PDA.  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          Second-degree AV block s/p pacemaker     Hypertension  Hyperlipidemia  Mild systolic heart failure with ejection fraction 42% by MPI       Cath:  Severe three-vessel coronary artery disease including severe left main bifurcation disease and  of obtuse marginal branch    LVEDP 20 mmHg     Recommendations:      Cont aspirin   Cont lipitor > increase dose to 40 mg po daily   Resume metoprolol and losartan as BP tolerates   Amio for Afib ppx      Post procedure care per CT surgery   Will be available to see as needed         [x]        High complexity decision making was performed    Subjective:     HPI:    Doing well post CABG   Sitting in chair     Objective:      Physical Exam:    Last 24hrs VS reviewed since prior progress note. Most recent are:    BP (!) 169/76   Pulse 60   Temp 98 °F (36.7 °C) (Oral)   Resp 18   Ht 1.676 m (5' 6\")   Wt 79.4 kg (175 lb)   SpO2 95%   BMI 28.25 kg/m²   No intake or output data in the 24 hours ending 05/29/24 1010      General: Alert and oriented x3, no acute distress   Neck: Supple   Respiratory: No respiratory distress, clear lung sound   Cardiovascular: Regular rate rhythm, S1S2, no murmur   Abdomen: soft, non tender, non distended   Neuro: moves all extremities, oriented x3   Skin: warm and dry   Extremity: no edema, warm to touch      Data Review     Tele: NSR    Lab Data Personally Reviewed:    Recent Labs     05/28/24  0802   WBC 8.0   HGB 15.5   HCT 46.7        Recent Labs     05/28/24  1252   INR 1.0

## 2024-05-31 NOTE — PROGRESS NOTES
Cardiac Surgery Care Coordinator met with Sukhdeep Lomeli. Reviewed plan of care and discussed goals for the day. Sukhdeep Lomeli has a good understanding of his plan for the day.  Reinforced sternal precautions and encouraged continued use of the incentive spirometer.  Sukhdeep Lomeli can pull 1000ml with good effort.  Discussed incision care, daily weights and temp with pt and wife. Provided pt with dial soap, dynahex and cloths to wash incision. Red reminder bracelet on left wrist. Reviewed purpose of bracelet and when to call. Discussed possible discharge date and encouraged Sukhdeep Lomeli to verbalize.   Will continue to follow for educational and emotional needs.  Di Lambert RN

## 2024-05-31 NOTE — PROGRESS NOTES
1915: Bedside and Verbal shift change report given to SHANICE Crouch (oncoming nurse) by SHANICE Haley (offgoing nurse). Report included the following information Nurse Handoff Report, Index, Adult Overview, Surgery Report, Intake/Output, MAR, Recent Results, Med Rec Status, Cardiac Rhythm AVPACED, and Neuro Assessment.     2000: Shift assessment complete. Pt is A+O x4 afebrile, rates mild incisional pain 1/10. Pt is on 3L NC, lungs are diminished bilaterally. Pt is V Paced on monitor, HDS, all pulses palpable, no edema present, friction rub present. Abd is soft, non-tender, BS hypoactive, pt denies nausea. Urine output adequate, voiding clear, yellow urine via alan. MSI/CT dressing are clean, dry and intact, tube draining sanguinous fluid. See flow sheet for further details    2100: Sukhdeep Lomeli was extubated on 5/30/2024 at 1715 PM.  The patient was up to the chair for the first time on 05/30/24 at 2100 PM until 2115 PM. The patient tolerated the activity well.  --Pt sat on side of bed for several minutes and then stood at side of bed. Pt went from sitting to standing several times before being returned to bed. Pt tolerated activity well.    2135: Pt had 120 mL CT output while standing at side of bed, CVP 2-3, ABP Maps in low 60s, 1 Albumin given    2143: MAPs in 50s, phenylephrine restarted    2155: phenylephrine paused    0000: Reassessment complete. Phenylephrine remains off, pt now on 2L, no other changes    0344: Sudden drop in ABP. 93/40 (57), Non-invasive 100/52 (66), CVP 4. Pt responded well to earlier albumin admin, will give 3rd PRN albumin    0349: Lab called with critical AKIB 0.31, ALBUMIN currently infusing    0356: Phenylephrine restarted    0400: Reassessment complete, no acute changes, pt independently using IS, able to achieve 1000mL    0425: Phenylephrine off, EKG done, MSI cleansed with soap and water    2259-7347: art line, mac removed, Pt weighed and up to chair. Pt tolerated all activity well,

## 2024-05-31 NOTE — CARE COORDINATION
Care Management Initial Assessment       RUR: 13% \"low risk\"  Readmission? No  1st IM letter given? Yes, given by Patient Access Team on 5/30/24  1st  letter given: N/A    1355 PM: CM acknowledged PT/OT recommendation for HH services. CM to send HH referrals.     Initial note - 1119 AM: Chart reviewed. CM met with pt at the bedside to introduce self and role. Verified contact information and demographics. Pt resides with pt spouse in a two level home with 3 ABDIRASHID. Pt has two different residences; pt home address on file is not the residence pt will return to. Pt will return to pt home at [address] [place] [address]. Pt PCP is Dr. Devan Tucker with the last visit being on 4/2/24. Preferred pharmacy is [facility] on [address] [facility]. Pt has no hx of HH services or a SNF stay. Pt is independent with ADL's at baseline and has no DME needs. Pt owns a cane at home. Pt is an active . Pt has no ACP on file; pt is a FULL code. Pt stated pt has an ACP on file with pt PCP. Pt spouse able to transport pt home upon time of d/c.     CM discussed the potential recommendation of HH services; though, PT/OT have not been able to work with the pt yet. Pt did not have a preference of HH agency. CM to stand by to receive final recommendation from PT/OT team. Full assessment below:      05/31/24 1119   Service Assessment   Patient Orientation Person;Alert and Oriented;Place;Self;Situation   Cognition Alert   History Provided By Patient   Primary Caregiver Self   Support Systems Spouse/Significant Other   Patient's Healthcare Decision Maker is: Legal Next of Kin   PCP Verified by CM Yes  (Dr. Devan Tucker)   Last Visit to PCP   (within the last month)   Prior Functional Level Independent in ADLs/IADLs   Current Functional Level Independent in ADLs/IADLs   Can patient return to prior living arrangement Yes   Ability to make needs known: Good   Family able to assist with home care needs: Yes   Would you like for me to

## 2024-05-31 NOTE — PROGRESS NOTES
Cardiac Surgery ICU Progress Note    Admit Date: 2024  POD:  1 Day Post-Op    Procedure:  Procedure(s):  ON PUMP CORONARY ARTERY BYPASS GRAFTING TIMES FOUR WITH USE OF LEFT INTERNAL MAMMARY ARTERY, ENDOSCOPIC HARVESTING OF THE RIGHT GREATER SAPHENOUS VEIN (EVH BY BRANDON REYNOLDS), AND LEFT ATRIAL APPENDAGE LIGATION (E.R.A.S.). ESME BY DR. MEDINA.        Subjective:   Patient seen with Dr. Goodson, sitting up in chair, in NAD. Afebrile. HR 66, V-paced, Normotensive. SpO2 95 on RA. CVP 11. Denies any complaint. States he feels really well and is ready to go home. Denies any pain, SOB, headache, vision troubles, trouble swallowing, n/v, abdominal pain, numbness, swelling or tingling of extremities.      Objective:   Vitals:  Blood pressure (!) 103/54, pulse 67, temperature 98.1 °F (36.7 °C), temperature source Oral, resp. rate 17, height 1.676 m (5' 6\"), weight 79.4 kg (175 lb 0.7 oz), SpO2 97 %.  Temp (24hrs), Av.3 °F (36.8 °C), Min:97.5 °F (36.4 °C), Max:98.8 °F (37.1 °C)      Oxygen Flow Rate: Room Air    Oxygen Delivery Method: None    Ver[place]-Aissatou  Ver[place]-Aissatou  CVP (Mean): 11 mmHg     EKG/Rhythm:    Encounter Date: 24   EKG 12 lead   Result Value    Ventricular Rate 66    Atrial Rate 66    P-R Interval 184    QRS Duration 146    Q-T Interval 476    QTc Calculation (Bazett) 499    P Axis 42    R Axis -42    T Axis 3    Diagnosis      Atrial-sensed ventricular-paced rhythm  Abnormal ECG    Confirmed by Zac Barnhart MD ([id number]) on 2024 9:22:50 AM       Extubation Date / Time: 24 @ 1715    Feet to Floor: 24 @ 2100    CT Output: 710 ml since case, (310 ml overnight)    CXR: on L,  on R    Xray Result (most recent):  XR CHEST PORTABLE 2024    Narrative  EXAM: XR CHEST PORTABLE  ACC#: [id number]    INDICATION: Post op open heart surgery, []    COMPARISON: Chest radiograph May 30, 2024    FINDINGS: AP portable chest radiograph. Patient is status post sternotomy. The  ET tube and the

## 2024-06-01 ENCOUNTER — APPOINTMENT (OUTPATIENT)
Facility: HOSPITAL | Age: 79
DRG: 233 | End: 2024-06-01
Attending: STUDENT IN AN ORGANIZED HEALTH CARE EDUCATION/TRAINING PROGRAM
Payer: MEDICARE

## 2024-06-01 LAB
ANION GAP SERPL CALC-SCNC: 3 MMOL/L (ref 5–15)
BUN SERPL-MCNC: 26 MG/DL (ref 6–20)
BUN/CREAT SERPL: 28 (ref 12–20)
CALCIUM SERPL-MCNC: 7.3 MG/DL (ref 8.5–10.1)
CHLORIDE SERPL-SCNC: 111 MMOL/L (ref 97–108)
CO2 SERPL-SCNC: 24 MMOL/L (ref 21–32)
CREAT SERPL-MCNC: 0.92 MG/DL (ref 0.7–1.3)
ERYTHROCYTE [DISTWIDTH] IN BLOOD BY AUTOMATED COUNT: 13.2 % (ref 11.5–14.5)
GLUCOSE BLD STRIP.AUTO-MCNC: 106 MG/DL (ref 65–117)
GLUCOSE BLD STRIP.AUTO-MCNC: 108 MG/DL (ref 65–117)
GLUCOSE BLD STRIP.AUTO-MCNC: 113 MG/DL (ref 65–117)
GLUCOSE BLD STRIP.AUTO-MCNC: 118 MG/DL (ref 65–117)
GLUCOSE BLD STRIP.AUTO-MCNC: 120 MG/DL (ref 65–117)
GLUCOSE BLD STRIP.AUTO-MCNC: 121 MG/DL (ref 65–117)
GLUCOSE BLD STRIP.AUTO-MCNC: 123 MG/DL (ref 65–117)
GLUCOSE BLD STRIP.AUTO-MCNC: 132 MG/DL (ref 65–117)
GLUCOSE BLD STRIP.AUTO-MCNC: 140 MG/DL (ref 65–117)
GLUCOSE BLD STRIP.AUTO-MCNC: 147 MG/DL (ref 65–117)
GLUCOSE BLD STRIP.AUTO-MCNC: 152 MG/DL (ref 65–117)
GLUCOSE BLD STRIP.AUTO-MCNC: 154 MG/DL (ref 65–117)
GLUCOSE BLD STRIP.AUTO-MCNC: 193 MG/DL (ref 65–117)
GLUCOSE BLD STRIP.AUTO-MCNC: 88 MG/DL (ref 65–117)
GLUCOSE BLD STRIP.AUTO-MCNC: 97 MG/DL (ref 65–117)
GLUCOSE BLD STRIP.AUTO-MCNC: 97 MG/DL (ref 65–117)
GLUCOSE SERPL-MCNC: 84 MG/DL (ref 65–100)
HCT VFR BLD AUTO: 31.2 % (ref 36.6–50.3)
HGB BLD-MCNC: 10.1 G/DL (ref 12.1–17)
MAGNESIUM SERPL-MCNC: 2.2 MG/DL (ref 1.6–2.4)
MCH RBC QN AUTO: 31.2 PG (ref 26–34)
MCHC RBC AUTO-ENTMCNC: 32.4 G/DL (ref 30–36.5)
MCV RBC AUTO: 96.3 FL (ref 80–99)
NRBC # BLD: 0 K/UL (ref 0–0.01)
NRBC BLD-RTO: 0 PER 100 WBC
PLATELET # BLD AUTO: 141 K/UL (ref 150–400)
PMV BLD AUTO: 10.6 FL (ref 8.9–12.9)
POTASSIUM SERPL-SCNC: 3.8 MMOL/L (ref 3.5–5.1)
RBC # BLD AUTO: 3.24 M/UL (ref 4.1–5.7)
SERVICE CMNT-IMP: ABNORMAL
SERVICE CMNT-IMP: NORMAL
SODIUM SERPL-SCNC: 138 MMOL/L (ref 136–145)
WBC # BLD AUTO: 15.3 K/UL (ref 4.1–11.1)

## 2024-06-01 PROCEDURE — 85027 COMPLETE CBC AUTOMATED: CPT

## 2024-06-01 PROCEDURE — 2060000000 HC ICU INTERMEDIATE R&B

## 2024-06-01 PROCEDURE — 2580000003 HC RX 258

## 2024-06-01 PROCEDURE — 80048 BASIC METABOLIC PNL TOTAL CA: CPT

## 2024-06-01 PROCEDURE — 36415 COLL VENOUS BLD VENIPUNCTURE: CPT

## 2024-06-01 PROCEDURE — 71045 X-RAY EXAM CHEST 1 VIEW: CPT

## 2024-06-01 PROCEDURE — 83735 ASSAY OF MAGNESIUM: CPT

## 2024-06-01 PROCEDURE — 97110 THERAPEUTIC EXERCISES: CPT

## 2024-06-01 PROCEDURE — 6360000002 HC RX W HCPCS

## 2024-06-01 PROCEDURE — 6370000000 HC RX 637 (ALT 250 FOR IP): Performed by: NURSE PRACTITIONER

## 2024-06-01 PROCEDURE — 6370000000 HC RX 637 (ALT 250 FOR IP)

## 2024-06-01 PROCEDURE — 82962 GLUCOSE BLOOD TEST: CPT

## 2024-06-01 PROCEDURE — 6370000000 HC RX 637 (ALT 250 FOR IP): Performed by: THORACIC SURGERY (CARDIOTHORACIC VASCULAR SURGERY)

## 2024-06-01 PROCEDURE — 6360000002 HC RX W HCPCS: Performed by: NURSE PRACTITIONER

## 2024-06-01 PROCEDURE — 97116 GAIT TRAINING THERAPY: CPT

## 2024-06-01 RX ORDER — POTASSIUM CHLORIDE 20 MEQ/1
20 TABLET, EXTENDED RELEASE ORAL ONCE
Status: COMPLETED | OUTPATIENT
Start: 2024-06-01 | End: 2024-06-01

## 2024-06-01 RX ORDER — FUROSEMIDE 10 MG/ML
40 INJECTION INTRAMUSCULAR; INTRAVENOUS ONCE
Status: COMPLETED | OUTPATIENT
Start: 2024-06-01 | End: 2024-06-01

## 2024-06-01 RX ORDER — INSULIN GLARGINE 100 [IU]/ML
19 INJECTION, SOLUTION SUBCUTANEOUS ONCE
Status: COMPLETED | OUTPATIENT
Start: 2024-06-01 | End: 2024-06-01

## 2024-06-01 RX ADMIN — SENNOSIDES AND DOCUSATE SODIUM 1 TABLET: 50; 8.6 TABLET ORAL at 19:54

## 2024-06-01 RX ADMIN — ACETAMINOPHEN 975 MG: 325 TABLET ORAL at 05:23

## 2024-06-01 RX ADMIN — GABAPENTIN 200 MG: 100 CAPSULE ORAL at 20:30

## 2024-06-01 RX ADMIN — GABAPENTIN 200 MG: 100 CAPSULE ORAL at 09:28

## 2024-06-01 RX ADMIN — INSULIN GLARGINE 19 UNITS: 100 INJECTION, SOLUTION SUBCUTANEOUS at 13:24

## 2024-06-01 RX ADMIN — WATER 2000 MG: 1 INJECTION INTRAMUSCULAR; INTRAVENOUS; SUBCUTANEOUS at 04:24

## 2024-06-01 RX ADMIN — ACETAMINOPHEN 975 MG: 325 TABLET ORAL at 12:45

## 2024-06-01 RX ADMIN — MUPIROCIN 1 EACH: 20 OINTMENT TOPICAL at 20:00

## 2024-06-01 RX ADMIN — Medication 400 MG: at 09:38

## 2024-06-01 RX ADMIN — INSULIN LISPRO 1 UNITS: 100 INJECTION, SOLUTION INTRAVENOUS; SUBCUTANEOUS at 20:08

## 2024-06-01 RX ADMIN — POLYETHYLENE GLYCOL 3350 17 G: 17 POWDER, FOR SOLUTION ORAL at 09:36

## 2024-06-01 RX ADMIN — Medication 400 MG: at 19:54

## 2024-06-01 RX ADMIN — SENNOSIDES AND DOCUSATE SODIUM 1 TABLET: 50; 8.6 TABLET ORAL at 09:39

## 2024-06-01 RX ADMIN — ACETAMINOPHEN 975 MG: 325 TABLET ORAL at 18:17

## 2024-06-01 RX ADMIN — SODIUM CHLORIDE, PRESERVATIVE FREE 10 ML: 5 INJECTION INTRAVENOUS at 19:58

## 2024-06-01 RX ADMIN — FAMOTIDINE 20 MG: 20 TABLET, FILM COATED ORAL at 19:54

## 2024-06-01 RX ADMIN — INSULIN LISPRO 2 UNITS: 100 INJECTION, SOLUTION INTRAVENOUS; SUBCUTANEOUS at 12:46

## 2024-06-01 RX ADMIN — ENOXAPARIN SODIUM 40 MG: 100 INJECTION SUBCUTANEOUS at 09:28

## 2024-06-01 RX ADMIN — ASPIRIN 81 MG CHEWABLE TABLET 81 MG: 81 TABLET CHEWABLE at 19:54

## 2024-06-01 RX ADMIN — GABAPENTIN 200 MG: 100 CAPSULE ORAL at 14:14

## 2024-06-01 RX ADMIN — INSULIN LISPRO 2 UNITS: 100 INJECTION, SOLUTION INTRAVENOUS; SUBCUTANEOUS at 09:50

## 2024-06-01 RX ADMIN — INSULIN LISPRO 4 UNITS: 100 INJECTION, SOLUTION INTRAVENOUS; SUBCUTANEOUS at 12:46

## 2024-06-01 RX ADMIN — MUPIROCIN: 20 OINTMENT TOPICAL at 09:40

## 2024-06-01 RX ADMIN — ATORVASTATIN CALCIUM 40 MG: 40 TABLET, FILM COATED ORAL at 19:54

## 2024-06-01 RX ADMIN — AMIODARONE HYDROCHLORIDE 400 MG: 200 TABLET ORAL at 19:54

## 2024-06-01 RX ADMIN — SODIUM CHLORIDE, PRESERVATIVE FREE 10 ML: 5 INJECTION INTRAVENOUS at 09:40

## 2024-06-01 RX ADMIN — FUROSEMIDE 40 MG: 10 INJECTION, SOLUTION INTRAMUSCULAR; INTRAVENOUS at 09:39

## 2024-06-01 RX ADMIN — POTASSIUM CHLORIDE 20 MEQ: 1500 TABLET, EXTENDED RELEASE ORAL at 09:38

## 2024-06-01 RX ADMIN — AMIODARONE HYDROCHLORIDE 400 MG: 200 TABLET ORAL at 09:39

## 2024-06-01 RX ADMIN — METOPROLOL TARTRATE 12.5 MG: 25 TABLET, FILM COATED ORAL at 09:50

## 2024-06-01 RX ADMIN — 0.12% CHLORHEXIDINE GLUCONATE 15 ML: 1.2 RINSE ORAL at 19:54

## 2024-06-01 RX ADMIN — FAMOTIDINE 20 MG: 20 TABLET, FILM COATED ORAL at 09:38

## 2024-06-01 RX ADMIN — 0.12% CHLORHEXIDINE GLUCONATE 15 ML: 1.2 RINSE ORAL at 09:38

## 2024-06-01 ASSESSMENT — PAIN SCALES - GENERAL
PAINLEVEL_OUTOF10: 0
PAINLEVEL_OUTOF10: 2
PAINLEVEL_OUTOF10: 0
PAINLEVEL_OUTOF10: 0
PAINLEVEL_OUTOF10: 3
PAINLEVEL_OUTOF10: 0
PAINLEVEL_OUTOF10: 2

## 2024-06-01 ASSESSMENT — PAIN DESCRIPTION - PAIN TYPE
TYPE: SURGICAL PAIN
TYPE: SURGICAL PAIN

## 2024-06-01 ASSESSMENT — PAIN DESCRIPTION - DESCRIPTORS
DESCRIPTORS: SORE

## 2024-06-01 ASSESSMENT — PAIN DESCRIPTION - LOCATION
LOCATION: CHEST;INCISION
LOCATION: CHEST;INCISION
LOCATION: CHEST

## 2024-06-01 NOTE — PROGRESS NOTES
1900: Report received from Sudha PRASAD.    2030: ambulated hallway with walker, 2 laps then returned to bed.    Chest tube continued to have small air leak, minimal output, alan in place making about 30 - 40+ cc/hr.    On room air, V- paced by permanent pacemaker. Map > 65.     0200: air leak no longer present to chest tube.    Continues on room air, hemodynamics stable.    0600: to toilet, passed gas, no BM    0615: up to recliner, chest tube 150 ml for this hour, after standing and with activity.

## 2024-06-01 NOTE — FLOWSHEET NOTE
06/01/24 1200 06/01/24 1202   Vitals   BP (!) 99/50 (!) 95/47   MAP (Calculated) 66 63   MAP (mmHg) (!) 63 (!) 60   BP Location Left upper arm Left upper arm   BP Upper/Lower Upper Upper   BP Method Automatic Automatic   Patient Position (S)  Sitting (S)  Standing

## 2024-06-01 NOTE — PROGRESS NOTES
0915: CT clamped per MD Giron   1100 CHG bath given,  1253 Simmons removed.  1545 Patient ambulated in hallway .Dostance ambulated 630 feet. Patient tolerated activity very well.    Cardiac Surgery End of Shift Summary    Patient is s/p CABG X 4 on 05/28/2024.    Vitals:  Patient Vitals for the past 2 hrs:   Temp Pulse Resp BP SpO2   06/01/24 1700 -- 60 -- (!) 99/52 94 %   06/01/24 1600 98.9 °F (37.2 °C) 60 24 (!) 119/58 94 %          Cardiac:  Rhythm:   V-paced with underlined SR     Respiratory:   Oxygen Therapy: Exam; oxygen delivery: room air    Diuretic: YES      ISS Teaching YES   Use : YES     Volume: 1000      Lines & Drains:  LDA Active: PIV Chest tubes      Continuous Infusions/Rates:   Shift Start: Insulin   Shift End: Insulin stopped at 1514      Shift Totals:  Fluid Resuscitation: none    12 hr Chest Tube Output:  160  clamped at  0915 per MD Giron request  12 hr Urine Output:  1060 ml plus 1 large unmeasured    GI/:      Diet: Regular,cardiac  Nausea:  NO     Simmons:  NO Removed: 06/01/2024 at 1253Date/Time    Voiding: YES     Bladder Scan: 0       Skin/Wounds:       MSI  Hygiene:   Last CHG bath: 06/01/2024       CT dsg changed:  YES   Incision Care: YES    Pain Control:   scheduled Tylenol only    Activity:   Up in chair YES ; 3 times,  Duration 120 min         Ambulated YES; Distance 630 ft , 2 times plus once 210 feet with PT      Significant Events: none     Concerns/ Communication: none

## 2024-06-01 NOTE — PROGRESS NOTES
Cardiac Surgery ICU Progress Note    Admit Date: 2024  POD:  2 Days Post-Op    Procedure:  Procedure(s):  ON PUMP CORONARY ARTERY BYPASS GRAFTING TIMES FOUR WITH USE OF LEFT INTERNAL MAMMARY ARTERY, ENDOSCOPIC HARVESTING OF THE RIGHT GREATER SAPHENOUS VEIN (EVH BY BRANDON REYNOLDS), AND LEFT ATRIAL APPENDAGE LIGATION (E.R.A.S.). ESME BY DR. MEDINA.        Subjective:   Patient seen with Dr. Giron. Afebrile, on RA. Up on toilet. No complaints. On insulin gtt.       Objective:   Vitals:  Blood pressure (!) 103/52, pulse 63, temperature 99.1 °F (37.3 °C), temperature source Oral, resp. rate 19, height 1.676 m (5' 6\"), weight 83.1 kg (183 lb 4.8 oz), SpO2 94 %.  Temp (24hrs), Av.3 °F (36.8 °C), Min:97.7 °F (36.5 °C), Max:99.1 °F (37.3 °C)      Oxygen Flow Rate: Room Air    Oxygen Delivery Method: None    Mar[place]-Aissatou  Marion-Aissatou  CVP (Mean): 11 mmHg     EKG/Rhythm:    Encounter Date: 24   EKG 12 lead   Result Value    Ventricular Rate 66    Atrial Rate 66    P-R Interval 184    QRS Duration 146    Q-T Interval 476    QTc Calculation (Bazett) 499    P Axis 42    R Axis -42    T Axis 3    Diagnosis      Atrial-sensed ventricular-paced rhythm  Abnormal ECG    Confirmed by Zac Barnhart MD ([id number]) on 2024 9:22:50 AM       Extubation Date / Time: 24 @ 1715    Feet to Floor: 24 @ 2100    CT Output: 440ml/24 hrs (220 ml overnight)    Xray Result (most recent):  XR CHEST PORTABLE 2024    Narrative  PORTABLE CHEST RADIOGRAPH/S: 2024 4:28 AM    INDICATION: Postop open heart surgery.    COMPARISON: 2024, 2024, 2024.    TECHNIQUE: Portable frontal semiupright radiograph/s of the chest.    FINDINGS:  Passive atelectasis is associated with a left pleural effusion. The right lung  is clear. The central airways are patent. No pneumothorax. No left chest tube  and pacemaker are in place. Post CABG, left atrial appendage clipping, and right  shoulder replacement. The heart is

## 2024-06-01 NOTE — PROCEDURES
Pulmonary Function Test        PATIENT ID: Sukhdeep Lomeli  MRN: [id number]   YOB: 1945    DATE OF ADMISSION: [id number]    PRIMARY CARE PROVIDER: Devan Tucker MD       Spirometry:  Spirometry shows mild obstructive defect.    Bronchodilator response:  No significant improvement with bronchodilator therapy    Volumes:  Lung volumes not performed.    Diffusion:  Diffusing capacity is normal.    Flow-Volume:  The flow-volume loop is compatible with small airways obstruction.      Signed:   Matthiue Braxton MD  6/1/2024  4:34 PM

## 2024-06-02 ENCOUNTER — APPOINTMENT (OUTPATIENT)
Facility: HOSPITAL | Age: 79
DRG: 233 | End: 2024-06-02
Attending: STUDENT IN AN ORGANIZED HEALTH CARE EDUCATION/TRAINING PROGRAM
Payer: MEDICARE

## 2024-06-02 LAB
ANION GAP SERPL CALC-SCNC: 4 MMOL/L (ref 5–15)
BUN SERPL-MCNC: 33 MG/DL (ref 6–20)
BUN/CREAT SERPL: 32 (ref 12–20)
CALCIUM SERPL-MCNC: 8.2 MG/DL (ref 8.5–10.1)
CHLORIDE SERPL-SCNC: 110 MMOL/L (ref 97–108)
CO2 SERPL-SCNC: 23 MMOL/L (ref 21–32)
CREAT SERPL-MCNC: 1.03 MG/DL (ref 0.7–1.3)
ERYTHROCYTE [DISTWIDTH] IN BLOOD BY AUTOMATED COUNT: 13.1 % (ref 11.5–14.5)
GLUCOSE BLD STRIP.AUTO-MCNC: 131 MG/DL (ref 65–117)
GLUCOSE BLD STRIP.AUTO-MCNC: 99 MG/DL (ref 65–117)
GLUCOSE SERPL-MCNC: 106 MG/DL (ref 65–100)
HCT VFR BLD AUTO: 33.8 % (ref 36.6–50.3)
HGB BLD-MCNC: 11.1 G/DL (ref 12.1–17)
MAGNESIUM SERPL-MCNC: 2.5 MG/DL (ref 1.6–2.4)
MCH RBC QN AUTO: 31.4 PG (ref 26–34)
MCHC RBC AUTO-ENTMCNC: 32.8 G/DL (ref 30–36.5)
MCV RBC AUTO: 95.8 FL (ref 80–99)
NRBC # BLD: 0 K/UL (ref 0–0.01)
NRBC BLD-RTO: 0 PER 100 WBC
PLATELET # BLD AUTO: 146 K/UL (ref 150–400)
PMV BLD AUTO: 10.8 FL (ref 8.9–12.9)
POTASSIUM SERPL-SCNC: 4.6 MMOL/L (ref 3.5–5.1)
RBC # BLD AUTO: 3.53 M/UL (ref 4.1–5.7)
SERVICE CMNT-IMP: ABNORMAL
SERVICE CMNT-IMP: NORMAL
SODIUM SERPL-SCNC: 137 MMOL/L (ref 136–145)
WBC # BLD AUTO: 15.4 K/UL (ref 4.1–11.1)

## 2024-06-02 PROCEDURE — 80048 BASIC METABOLIC PNL TOTAL CA: CPT

## 2024-06-02 PROCEDURE — 85027 COMPLETE CBC AUTOMATED: CPT

## 2024-06-02 PROCEDURE — 2700000000 HC OXYGEN THERAPY PER DAY

## 2024-06-02 PROCEDURE — 71045 X-RAY EXAM CHEST 1 VIEW: CPT

## 2024-06-02 PROCEDURE — 97116 GAIT TRAINING THERAPY: CPT

## 2024-06-02 PROCEDURE — 6370000000 HC RX 637 (ALT 250 FOR IP)

## 2024-06-02 PROCEDURE — 36415 COLL VENOUS BLD VENIPUNCTURE: CPT

## 2024-06-02 PROCEDURE — 83735 ASSAY OF MAGNESIUM: CPT

## 2024-06-02 PROCEDURE — 6360000002 HC RX W HCPCS: Performed by: NURSE PRACTITIONER

## 2024-06-02 PROCEDURE — 82962 GLUCOSE BLOOD TEST: CPT

## 2024-06-02 PROCEDURE — 6360000002 HC RX W HCPCS

## 2024-06-02 PROCEDURE — 2060000000 HC ICU INTERMEDIATE R&B

## 2024-06-02 PROCEDURE — 97110 THERAPEUTIC EXERCISES: CPT

## 2024-06-02 PROCEDURE — 2580000003 HC RX 258

## 2024-06-02 RX ORDER — FUROSEMIDE 10 MG/ML
40 INJECTION INTRAMUSCULAR; INTRAVENOUS ONCE
Status: COMPLETED | OUTPATIENT
Start: 2024-06-02 | End: 2024-06-02

## 2024-06-02 RX ADMIN — ACETAMINOPHEN 975 MG: 325 TABLET ORAL at 04:49

## 2024-06-02 RX ADMIN — SODIUM CHLORIDE, PRESERVATIVE FREE 10 ML: 5 INJECTION INTRAVENOUS at 19:47

## 2024-06-02 RX ADMIN — ACETAMINOPHEN 975 MG: 325 TABLET ORAL at 18:26

## 2024-06-02 RX ADMIN — MUPIROCIN: 20 OINTMENT TOPICAL at 08:18

## 2024-06-02 RX ADMIN — AMIODARONE HYDROCHLORIDE 400 MG: 200 TABLET ORAL at 09:23

## 2024-06-02 RX ADMIN — ACETAMINOPHEN 975 MG: 325 TABLET ORAL at 12:34

## 2024-06-02 RX ADMIN — FAMOTIDINE 20 MG: 20 TABLET, FILM COATED ORAL at 08:00

## 2024-06-02 RX ADMIN — FUROSEMIDE 40 MG: 10 INJECTION, SOLUTION INTRAMUSCULAR; INTRAVENOUS at 10:11

## 2024-06-02 RX ADMIN — SENNOSIDES AND DOCUSATE SODIUM 1 TABLET: 50; 8.6 TABLET ORAL at 19:50

## 2024-06-02 RX ADMIN — GABAPENTIN 200 MG: 100 CAPSULE ORAL at 09:23

## 2024-06-02 RX ADMIN — GABAPENTIN 200 MG: 100 CAPSULE ORAL at 14:30

## 2024-06-02 RX ADMIN — AMIODARONE HYDROCHLORIDE 400 MG: 200 TABLET ORAL at 19:50

## 2024-06-02 RX ADMIN — ATORVASTATIN CALCIUM 40 MG: 40 TABLET, FILM COATED ORAL at 19:50

## 2024-06-02 RX ADMIN — SENNOSIDES AND DOCUSATE SODIUM 1 TABLET: 50; 8.6 TABLET ORAL at 08:02

## 2024-06-02 RX ADMIN — 0.12% CHLORHEXIDINE GLUCONATE 15 ML: 1.2 RINSE ORAL at 19:51

## 2024-06-02 RX ADMIN — POLYETHYLENE GLYCOL 3350 17 G: 17 POWDER, FOR SOLUTION ORAL at 08:01

## 2024-06-02 RX ADMIN — Medication 400 MG: at 19:50

## 2024-06-02 RX ADMIN — FAMOTIDINE 20 MG: 20 TABLET, FILM COATED ORAL at 19:50

## 2024-06-02 RX ADMIN — 0.12% CHLORHEXIDINE GLUCONATE 15 ML: 1.2 RINSE ORAL at 08:18

## 2024-06-02 RX ADMIN — ASPIRIN 81 MG CHEWABLE TABLET 81 MG: 81 TABLET CHEWABLE at 19:50

## 2024-06-02 RX ADMIN — SODIUM CHLORIDE, PRESERVATIVE FREE 10 ML: 5 INJECTION INTRAVENOUS at 08:02

## 2024-06-02 RX ADMIN — ENOXAPARIN SODIUM 40 MG: 100 INJECTION SUBCUTANEOUS at 09:23

## 2024-06-02 RX ADMIN — MUPIROCIN: 20 OINTMENT TOPICAL at 19:46

## 2024-06-02 RX ADMIN — GABAPENTIN 200 MG: 100 CAPSULE ORAL at 20:30

## 2024-06-02 ASSESSMENT — PAIN DESCRIPTION - DESCRIPTORS: DESCRIPTORS: SORE

## 2024-06-02 ASSESSMENT — PAIN SCALES - GENERAL
PAINLEVEL_OUTOF10: 2
PAINLEVEL_OUTOF10: 0

## 2024-06-02 ASSESSMENT — PAIN - FUNCTIONAL ASSESSMENT: PAIN_FUNCTIONAL_ASSESSMENT: ACTIVITIES ARE NOT PREVENTED

## 2024-06-02 ASSESSMENT — PAIN DESCRIPTION - LOCATION: LOCATION: CHEST

## 2024-06-02 NOTE — PROGRESS NOTES
1900: report received from Gabrielle PRASAD    Chest tube currently clamped, pt denies SOB. No presence of subcutaneous emphysema.    V-paced from permanent pacemaker.    2200: 1L NC applied to pt due to o2 sat dropping to 86-88 on RA while asleep

## 2024-06-02 NOTE — PROGRESS NOTES
1900: Bedside and Verbal shift change report given to SHANICE Crouch (oncoming nurse) by SHANICE Anthony (offgoing nurse). Report included the following information Nurse Handoff Report, Index, Adult Overview, Surgery Report, Intake/Output, MAR, Recent Results, Med Rec Status, Cardiac Rhythm VPaced, and Neuro Assessment.     2000: Shift assessment complete. Pt is A+Ox4 afebrile, denies pain. Pt is on RA, lungs are clear/diminished in bases bilaterally. Pt is Vpaced on monitor, all pulses palpable, 1+ pitting edema in BLE. Chest incision is clean, dry and intact. CT to suction, no airleak present, draining serosanguinous fluid. Abd is soft, non-tender, BS active. Pt voiding clear, yellow urine. See flow sheet for further details.    0000: Reassessment complete, no acute changes    0400: Reassessment complete. Pt ambulated to restroom, 40mL out from chest tube during activity. MSI cleansed with soap and water, chest tube dressing clean and dry, top and bottom reinforced with tape.     0635: pt up to scale, then walked ~330 ft in hallway on RA, VSS, then returned to chair    0700: Bedside and Verbal shift change report given to SHANICE Reyes (oncoming nurse) by SHANICE Crouch (offgoing nurse). Report included the following information Nurse Handoff Report, Index, Adult Overview, Surgery Report, Intake/Output, MAR, Recent Results, Med Rec Status, and Cardiac Rhythm VPACED .

## 2024-06-02 NOTE — PROGRESS NOTES
Cardiac Surgery ICU Progress Note    Admit Date: 2024  POD:  3 Days Post-Op    Procedure:  Procedure(s):  ON PUMP CORONARY ARTERY BYPASS GRAFTING TIMES FOUR WITH USE OF LEFT INTERNAL MAMMARY ARTERY, ENDOSCOPIC HARVESTING OF THE RIGHT GREATER SAPHENOUS VEIN (EVH BY BRANDON REYNOLDS), AND LEFT ATRIAL APPENDAGE LIGATION (E.R.A.S.). ESME BY DR. MEDINA.        Subjective:   Patient seen with Dr. Giron. Afebrile, on RA. Up in chair. Feels well. No complaints. CT clamped.       Objective:   Vitals:  Blood pressure (!) 110/57, pulse 86, temperature 98.4 °F (36.9 °C), temperature source Oral, resp. rate 25, height 1.676 m (5' 6\"), weight 83.5 kg (184 lb 1.6 oz), SpO2 94 %.  Temp (24hrs), Av.5 °F (36.9 °C), Min:97.9 °F (36.6 °C), Max:99.1 °F (37.3 °C)      Oxygen Flow Rate: Room Air    Oxygen Delivery Method: None    EKG/Rhythm:    Encounter Date: 24   EKG 12 lead   Result Value    Ventricular Rate 66    Atrial Rate 66    P-R Interval 184    QRS Duration 146    Q-T Interval 476    QTc Calculation (Bazett) 499    P Axis 42    R Axis -42    T Axis 3    Diagnosis      Atrial-sensed ventricular-paced rhythm  Abnormal ECG    Confirmed by Zac Barnhart MD ([id number]) on 2024 9:22:50 AM       Extubation Date / Time: 24 @ 1715    Feet to Floor: 24 @ 2100    CT Output: 200 ml/24 hrs (40 ml overnight)    Xray Result (most recent):  XR CHEST PORTABLE 2024    Narrative  PORTABLE CHEST RADIOGRAPH/S: 2024 4:28 AM    INDICATION: Postop open heart surgery.    COMPARISON: 2024, 2024, 2024.    TECHNIQUE: Portable frontal semiupright radiograph/s of the chest.    FINDINGS:  Passive atelectasis is associated with a left pleural effusion. The right lung  is clear. The central airways are patent. No pneumothorax. No left chest tube  and pacemaker are in place. Post CABG, left atrial appendage clipping, and right  shoulder replacement. The heart is enlarged.    Impression  Cardiomegaly. Left pleural

## 2024-06-02 NOTE — PROGRESS NOTES
Cardiac Surgery End of Shift Summary    Patient is s/p OP CABG X 4 on 05/282024.    Vitals:  Patient Vitals for the past 2 hrs:   Pulse BP   06/02/24 1800 74 103/69          Cardiac:  Rhythm:   Ventricular paced,underlined SR         Respiratory:   Oxygen Therapy: Exam; oxygen delivery: room air    Diuretic: YES      ISS Teaching YES   Use : YES     Volume: 1500 ml      Lines & Drains:  LDA Active: PIV and Chest Tube          Shift Totals:  Fluid Resuscitation: N/A    12 hr Chest Tube Output:  260 ml  12 hr Urine Output:  2150      GI/:      Diet: Regular/cardiac  Nausea:  NO     Simmons:  NO Removed: 06/01/2024        Voiding: YES         Skin/Wounds:  MSI         Hygiene:   Last CHG bath: 06/02/2024       CT dsg changed:  YES   Incision Care: YES    Pain Control:   scheduled Tylenol only    Activity:   Up in chair YES ; 4 times,  Duration 120 min         Ambulated YES; Distance 630 ft , 4 times      Significant Events: air leak in CT in AM- Per MD Giron -mikey CT and connect to suction     Concerns/ Communication: none. Patient is improving,motivated to ambulated,performs exercises ,IS independently

## 2024-06-03 ENCOUNTER — APPOINTMENT (OUTPATIENT)
Facility: HOSPITAL | Age: 79
DRG: 233 | End: 2024-06-03
Attending: STUDENT IN AN ORGANIZED HEALTH CARE EDUCATION/TRAINING PROGRAM
Payer: MEDICARE

## 2024-06-03 LAB
ANION GAP SERPL CALC-SCNC: 4 MMOL/L (ref 5–15)
BUN SERPL-MCNC: 26 MG/DL (ref 6–20)
BUN/CREAT SERPL: 26 (ref 12–20)
CALCIUM SERPL-MCNC: 8.5 MG/DL (ref 8.5–10.1)
CHLORIDE SERPL-SCNC: 110 MMOL/L (ref 97–108)
CO2 SERPL-SCNC: 24 MMOL/L (ref 21–32)
CREAT SERPL-MCNC: 0.99 MG/DL (ref 0.7–1.3)
ECHO BSA: 1.92 M2
ERYTHROCYTE [DISTWIDTH] IN BLOOD BY AUTOMATED COUNT: 13.1 % (ref 11.5–14.5)
GLUCOSE SERPL-MCNC: 108 MG/DL (ref 65–100)
HCT VFR BLD AUTO: 32.8 % (ref 36.6–50.3)
HGB BLD-MCNC: 11 G/DL (ref 12.1–17)
MAGNESIUM SERPL-MCNC: 2.7 MG/DL (ref 1.6–2.4)
MCH RBC QN AUTO: 31.8 PG (ref 26–34)
MCHC RBC AUTO-ENTMCNC: 33.5 G/DL (ref 30–36.5)
MCV RBC AUTO: 94.8 FL (ref 80–99)
NRBC # BLD: 0 K/UL (ref 0–0.01)
NRBC BLD-RTO: 0 PER 100 WBC
PLATELET # BLD AUTO: 212 K/UL (ref 150–400)
PMV BLD AUTO: 10.9 FL (ref 8.9–12.9)
POTASSIUM SERPL-SCNC: 4.1 MMOL/L (ref 3.5–5.1)
RBC # BLD AUTO: 3.46 M/UL (ref 4.1–5.7)
SODIUM SERPL-SCNC: 138 MMOL/L (ref 136–145)
VAS LEFT GSV ANKLE DIAM: 2.6 MM
VAS LEFT GSV AT KNEE DIAM: 4.3 MM
VAS LEFT GSV BK MID DIAM: 2.5 MM
VAS LEFT GSV BK PROX DIAM: 2.5 MM
VAS LEFT GSV THIGH DIST DIAM: 4.7 MM
VAS LEFT GSV THIGH MID DIAM: 4.7 MM
VAS LEFT GSV THIGH PROX DIAM: 5 MM
VAS RIGHT GSV ANKLE DIAM: 1.8 MM
VAS RIGHT GSV AT KNEE DIAM: 3.3 MM
VAS RIGHT GSV BK MID DIAM: 2.3 MM
VAS RIGHT GSV BK PROX DIAM: 2.7 MM
VAS RIGHT GSV THIGH DIST DIAM: 3.3 MM
VAS RIGHT GSV THIGH MID DIAM: 4.5 MM
VAS RIGHT GSV THIGH PROX DIAM: 4.4 MM
WBC # BLD AUTO: 12.1 K/UL (ref 4.1–11.1)

## 2024-06-03 PROCEDURE — 97530 THERAPEUTIC ACTIVITIES: CPT

## 2024-06-03 PROCEDURE — 6370000000 HC RX 637 (ALT 250 FOR IP): Performed by: NURSE PRACTITIONER

## 2024-06-03 PROCEDURE — 6370000000 HC RX 637 (ALT 250 FOR IP)

## 2024-06-03 PROCEDURE — 6360000002 HC RX W HCPCS

## 2024-06-03 PROCEDURE — 71045 X-RAY EXAM CHEST 1 VIEW: CPT

## 2024-06-03 PROCEDURE — 97535 SELF CARE MNGMENT TRAINING: CPT

## 2024-06-03 PROCEDURE — 80048 BASIC METABOLIC PNL TOTAL CA: CPT

## 2024-06-03 PROCEDURE — 83735 ASSAY OF MAGNESIUM: CPT

## 2024-06-03 PROCEDURE — 6360000002 HC RX W HCPCS: Performed by: NURSE PRACTITIONER

## 2024-06-03 PROCEDURE — 85027 COMPLETE CBC AUTOMATED: CPT

## 2024-06-03 PROCEDURE — 2060000000 HC ICU INTERMEDIATE R&B

## 2024-06-03 PROCEDURE — 36415 COLL VENOUS BLD VENIPUNCTURE: CPT

## 2024-06-03 PROCEDURE — 2580000003 HC RX 258

## 2024-06-03 RX ORDER — POTASSIUM CHLORIDE 20 MEQ/1
20 TABLET, EXTENDED RELEASE ORAL ONCE
Status: COMPLETED | OUTPATIENT
Start: 2024-06-03 | End: 2024-06-03

## 2024-06-03 RX ORDER — LANOLIN ALCOHOL/MO/W.PET/CERES
3 CREAM (GRAM) TOPICAL NIGHTLY PRN
Qty: 30 TABLET | Refills: 0 | Status: SHIPPED
Start: 2024-06-03

## 2024-06-03 RX ORDER — POLYETHYLENE GLYCOL 3350 17 G/17G
17 POWDER, FOR SOLUTION ORAL DAILY
Qty: 14 PACKET | Refills: 0 | Status: SHIPPED | OUTPATIENT
Start: 2024-06-04 | End: 2024-06-18

## 2024-06-03 RX ORDER — OXYCODONE HYDROCHLORIDE 5 MG/1
5 TABLET ORAL EVERY 8 HOURS PRN
Qty: 15 TABLET | Refills: 0 | Status: SHIPPED | OUTPATIENT
Start: 2024-06-03 | End: 2024-06-10

## 2024-06-03 RX ORDER — ACETAMINOPHEN 500 MG
1000 TABLET ORAL EVERY 6 HOURS PRN
Qty: 30 TABLET | Refills: 0 | Status: SHIPPED | OUTPATIENT
Start: 2024-06-03

## 2024-06-03 RX ORDER — AMIODARONE HYDROCHLORIDE 200 MG/1
TABLET ORAL
Qty: 84 TABLET | Refills: 0 | Status: SHIPPED | OUTPATIENT
Start: 2024-06-03

## 2024-06-03 RX ORDER — ATORVASTATIN CALCIUM 40 MG/1
40 TABLET, FILM COATED ORAL DAILY
Qty: 30 TABLET | Refills: 3 | Status: SHIPPED | OUTPATIENT
Start: 2024-06-03

## 2024-06-03 RX ORDER — FUROSEMIDE 20 MG/1
20 TABLET ORAL DAILY
Qty: 5 TABLET | Refills: 0 | Status: SHIPPED | OUTPATIENT
Start: 2024-06-03 | End: 2024-06-08

## 2024-06-03 RX ORDER — FUROSEMIDE 10 MG/ML
20 INJECTION INTRAMUSCULAR; INTRAVENOUS ONCE
Status: COMPLETED | OUTPATIENT
Start: 2024-06-03 | End: 2024-06-03

## 2024-06-03 RX ORDER — POTASSIUM CHLORIDE 20 MEQ/1
20 TABLET, EXTENDED RELEASE ORAL DAILY
Qty: 5 TABLET | Refills: 0 | Status: SHIPPED | OUTPATIENT
Start: 2024-06-03 | End: 2024-06-08

## 2024-06-03 RX ORDER — SENNA AND DOCUSATE SODIUM 50; 8.6 MG/1; MG/1
1 TABLET, FILM COATED ORAL 2 TIMES DAILY
Qty: 30 TABLET | Refills: 0 | Status: SHIPPED | OUTPATIENT
Start: 2024-06-03

## 2024-06-03 RX ADMIN — METOPROLOL TARTRATE 12.5 MG: 25 TABLET, FILM COATED ORAL at 20:35

## 2024-06-03 RX ADMIN — SODIUM CHLORIDE, PRESERVATIVE FREE 10 ML: 5 INJECTION INTRAVENOUS at 20:36

## 2024-06-03 RX ADMIN — FAMOTIDINE 20 MG: 20 TABLET, FILM COATED ORAL at 08:33

## 2024-06-03 RX ADMIN — FAMOTIDINE 20 MG: 20 TABLET, FILM COATED ORAL at 20:35

## 2024-06-03 RX ADMIN — GABAPENTIN 200 MG: 100 CAPSULE ORAL at 14:14

## 2024-06-03 RX ADMIN — GABAPENTIN 200 MG: 100 CAPSULE ORAL at 08:33

## 2024-06-03 RX ADMIN — ACETAMINOPHEN 975 MG: 325 TABLET ORAL at 17:46

## 2024-06-03 RX ADMIN — SENNOSIDES AND DOCUSATE SODIUM 1 TABLET: 50; 8.6 TABLET ORAL at 08:33

## 2024-06-03 RX ADMIN — ACETAMINOPHEN 975 MG: 325 TABLET ORAL at 07:14

## 2024-06-03 RX ADMIN — 0.12% CHLORHEXIDINE GLUCONATE 15 ML: 1.2 RINSE ORAL at 20:34

## 2024-06-03 RX ADMIN — ATORVASTATIN CALCIUM 40 MG: 40 TABLET, FILM COATED ORAL at 20:34

## 2024-06-03 RX ADMIN — ACETAMINOPHEN 975 MG: 325 TABLET ORAL at 23:48

## 2024-06-03 RX ADMIN — 0.12% CHLORHEXIDINE GLUCONATE 15 ML: 1.2 RINSE ORAL at 08:34

## 2024-06-03 RX ADMIN — FUROSEMIDE 20 MG: 10 INJECTION, SOLUTION INTRAMUSCULAR; INTRAVENOUS at 10:45

## 2024-06-03 RX ADMIN — Medication 400 MG: at 20:34

## 2024-06-03 RX ADMIN — SODIUM CHLORIDE, PRESERVATIVE FREE 10 ML: 5 INJECTION INTRAVENOUS at 08:35

## 2024-06-03 RX ADMIN — MUPIROCIN: 20 OINTMENT TOPICAL at 08:35

## 2024-06-03 RX ADMIN — ACETAMINOPHEN 975 MG: 325 TABLET ORAL at 00:16

## 2024-06-03 RX ADMIN — MUPIROCIN: 20 OINTMENT TOPICAL at 20:36

## 2024-06-03 RX ADMIN — ENOXAPARIN SODIUM 40 MG: 100 INJECTION SUBCUTANEOUS at 08:34

## 2024-06-03 RX ADMIN — AMIODARONE HYDROCHLORIDE 400 MG: 200 TABLET ORAL at 20:34

## 2024-06-03 RX ADMIN — METOPROLOL TARTRATE 12.5 MG: 25 TABLET, FILM COATED ORAL at 08:33

## 2024-06-03 RX ADMIN — AMIODARONE HYDROCHLORIDE 400 MG: 200 TABLET ORAL at 08:33

## 2024-06-03 RX ADMIN — GABAPENTIN 200 MG: 100 CAPSULE ORAL at 20:35

## 2024-06-03 RX ADMIN — POTASSIUM CHLORIDE 20 MEQ: 1500 TABLET, EXTENDED RELEASE ORAL at 08:33

## 2024-06-03 RX ADMIN — ASPIRIN 81 MG CHEWABLE TABLET 81 MG: 81 TABLET CHEWABLE at 20:34

## 2024-06-03 NOTE — DIABETES MGMT
Diabetes Management Team to sign off at this point as patient's blood glucose remains stable.  Please re-consult us if patient needs change.  Thank you for including us in their care.

## 2024-06-03 NOTE — CARDIO/PULMONARY
Chart reviewed: Patient is 78 y.o. male admitted with Abnormal ballistocardiogram [R94.39]  CAD in native artery [I25.10]    Education: CABG education folder at bedside.  Met with Sukhdeep Lomeli to review cardiac surgery post discharge instructions and to discuss participation in the Cardiac Rehab Program.     Educated using teach back method. Reviewed the use of bear for sternal support, daily weight and temperature monitoring, showering restrictions, signs and symptoms of infection at surgery sites, daily walking and arm exercises, and use of incentive spirometer.   Sukhdeep Lomeli was able to demonstrate proper use of incentive spirometer. Reviewed risk factors for CAD to include the following: family history, elevated BMI, hyperlipidemia, hypertension, diabetes, stress, and smoking.     Discussed Heart Healthy/Low Sodium (2000 mg.) diet. Discussed Cardiac Rehab Program format, benefits, and encouraged participation. Initial Cardiac Rehab Program intake appointment scheduled for 07/11 @ 10:30 and appointment information is on the AVS. General questions answered. Sukhdeep Lomeli verbalized understanding.       Will continue to follow for educational needs and enrollment in the Cardiac Rehab Program.     TRACY LYNCH RN

## 2024-06-03 NOTE — DISCHARGE SUMMARY
Dominance: [x ] Right [ ] Left [ ] Mixed  LM: Large caliber vessel with 30 to 40% ostial disease and 90% disease distally at bifurcation.  LAD: Large caliber vessel that wraps around the apex with mild to moderate heavily calcific disease in proximal segment with up to 30 to 50% narrowing, there is 70 to 80% disease in mid segment. There is 70% disease distally.  D1: small caliber vessel with 70% disease in proximal/ostial segment.  D2: Small to moderate caliber branching vessel with 80% disease in proximal segment.  D3: Small caliber vessel with moderate to severe ostial disease.    LCX: Moderate caliber vessel with 70 to 80% proximal/ostial disease. After obtuse marginal branch it is small caliber vessel.  OM1: Moderate caliber vessel with  and left to left and right to left collaterals.  OM2: Very small caliber vessel without significant stenosis.     RCA: Large caliber dominant vessel with proximal 60% heavily calcific disease, mid 50% and additional distal 70% disease.  PDA: Moderate caliber vessel with 80% disease in mid segment.  PLB: Small to moderate caliber vessel with mild to moderate disease.      ECHO:  Left Ventricle   Left ventricle size is normal. There is increased wall thickness with increased left ventricular mass index consistent with mild concentric hypertrophy.The ejection fraction by visual approximation is 55 - 60% . There is normal systolic function.  Right Ventricle   Not well visualized. Right ventricle size is normal. Normal systolic function.   Left Atrium   Left atrium is moderately dilated.   Right Atrium   Right atrium is dilated.   IVC/SVC   IVC diameter is less than or equal to 21 mm and decreases less than 50% during inspiration; therefore the estimated right atrial pressure is intermediate (~8 mmHg).   Mitral Valve   Mild globally thickened leaflets. Mildly calcified posterior annulus. No stenosis. Mild (1+) transvalvular regurgitation with a centrally directed jet .

## 2024-06-03 NOTE — PROGRESS NOTES
Chart reviewed and discussed with pt. He has met all his goals with PT, negotiated steps without difficulty and ambulating in the melgoza with nursing, no issues. Pt has no questions or concerns for PT. Will sign off and he will continue to mobilize with nursing for duration of hospital stay.

## 2024-06-03 NOTE — PROCEDURES
PROCEDURE NOTE  Date: 6/3/2024   Name: Sukhdeep Lomeli  YOB: 1945    Procedures    Rowdy drain dressing removed. Sites cleansed with CHG.  CT x2 removed without difficulty. Vaseline gauze and 4x4 dressing applied. VSS. RN updated.

## 2024-06-03 NOTE — CARE COORDINATION
Transition of Care Plan:    RUR: 9% \"low risk\"  Prior Level of Functioning: Independent with ADL's   Disposition: Home with spouse and [facility]   If SNF or IPR: Date FOC offered: N/A  Follow up appointments: PCP/Specialists as indicated  DME needed: None  Transportation at discharge: Pt spouse to transport pt home   IM/IMM Medicare/ letter given: 2nd IM needed prior to d/c   Is patient a  and connected with VA? No   Caregiver Contact: Naomi Lomeli (spouse), [phone]  Discharge Caregiver contacted prior to discharge? To be contacted  Care Conference needed? No  Barriers to discharge: CT air leak     1044 AM: Chart reviewed. CM following for d/c planning. Pt to return home with [facility] once medically stable. CM provided [facility] with updated clinical information. Pt spouse to transport pt home upon time of d/c. CM to continue to follow should CM needs arise.     TOM Briceño  Care Management  [facility]   [phone]

## 2024-06-03 NOTE — PROGRESS NOTES
Cardiac Surgery Stepdown Progress Note    Admit Date: 2024  POD:  4 Days Post-Op    Procedure:  Procedure(s):  ON PUMP CORONARY ARTERY BYPASS GRAFTING TIMES FOUR WITH USE OF LEFT INTERNAL MAMMARY ARTERY, ENDOSCOPIC HARVESTING OF THE RIGHT GREATER SAPHENOUS VEIN (EVH BY BRANDON REYNOLDS), AND LEFT ATRIAL APPENDAGE LIGATION (E.R.A.S.). ESME BY DR. MEDINA.        Subjective:   Patient seen up in chair.     Myself and BRANDON Archer at bedside to eval CT air leak. Leak still present, variable. CT placed clamped. CXR for noon ordered.     On RA. Afebrile.      Objective:   Vitals:  Blood pressure 121/64, pulse 67, temperature 98.9 °F (37.2 °C), temperature source Oral, resp. rate 18, height 1.676 m (5' 6\"), weight 81.8 kg (180 lb 5.4 oz), SpO2 93 %.  Temp (24hrs), Av.9 °F (37.2 °C), Min:98.7 °F (37.1 °C), Max:99.1 °F (37.3 °C)    Oxygen Flow Rate: Room Air    Oxygen Delivery Method: None    EKG/Rhythm:  Paced    CT Output: 360 ml/24 hrs (100 ml overnight)    Xray Result (most recent):  XR CHEST PORTABLE 2024    Narrative  EXAM:  XR CHEST PORTABLE    INDICATION: Post op open heart surgery    COMPARISON: 2024    TECHNIQUE: 0422 hours portable chest AP view    FINDINGS: Median sternotomy wires and left atrial exclusion device are again  shown. Left subclavian pacemaker with 2 leads is again noted. Right humeral  arthroplasty is again noted as well.    Left chest tube is again shown. Patchy opacities at the left pulmonary base  appears slightly improved in the interval. There is no pneumothorax or  substantial pleural effusion. Right lung is clear.    Cardiac, mediastinal and hilar contours are stable    Impression  Slight improved opacities at left pulmonary base. Other findings  unchanged.      Admission Weight: Last Weight   Weight - Scale: 79.4 kg (175 lb) Weight - Scale: 81.8 kg (180 lb 5.4 oz)     Intake / Output / Drain:  Current Shift: No intake/output data recorded.  Last 24 hrs.:   Intake/Output Summary

## 2024-06-03 NOTE — PROGRESS NOTES
0800: CTS team at bedside. Pt has small air leak in CT. CT clamped at this time. Plan for CXR at 1200.     1400:CT removed by Reina NP.

## 2024-06-04 ENCOUNTER — APPOINTMENT (OUTPATIENT)
Facility: HOSPITAL | Age: 79
DRG: 233 | End: 2024-06-04
Attending: STUDENT IN AN ORGANIZED HEALTH CARE EDUCATION/TRAINING PROGRAM
Payer: MEDICARE

## 2024-06-04 VITALS
TEMPERATURE: 97.9 F | DIASTOLIC BLOOD PRESSURE: 68 MMHG | WEIGHT: 177.69 LBS | HEIGHT: 66 IN | SYSTOLIC BLOOD PRESSURE: 122 MMHG | OXYGEN SATURATION: 94 % | HEART RATE: 61 BPM | BODY MASS INDEX: 28.56 KG/M2 | RESPIRATION RATE: 16 BRPM

## 2024-06-04 LAB
ANION GAP SERPL CALC-SCNC: 4 MMOL/L (ref 5–15)
BUN SERPL-MCNC: 29 MG/DL (ref 6–20)
BUN/CREAT SERPL: 27 (ref 12–20)
CALCIUM SERPL-MCNC: 8.5 MG/DL (ref 8.5–10.1)
CHLORIDE SERPL-SCNC: 110 MMOL/L (ref 97–108)
CO2 SERPL-SCNC: 24 MMOL/L (ref 21–32)
CREAT SERPL-MCNC: 1.09 MG/DL (ref 0.7–1.3)
ERYTHROCYTE [DISTWIDTH] IN BLOOD BY AUTOMATED COUNT: 13.2 % (ref 11.5–14.5)
GLUCOSE SERPL-MCNC: 97 MG/DL (ref 65–100)
HCT VFR BLD AUTO: 31.4 % (ref 36.6–50.3)
HGB BLD-MCNC: 10.2 G/DL (ref 12.1–17)
MAGNESIUM SERPL-MCNC: 2.5 MG/DL (ref 1.6–2.4)
MCH RBC QN AUTO: 31.2 PG (ref 26–34)
MCHC RBC AUTO-ENTMCNC: 32.5 G/DL (ref 30–36.5)
MCV RBC AUTO: 96 FL (ref 80–99)
NRBC # BLD: 0 K/UL (ref 0–0.01)
NRBC BLD-RTO: 0 PER 100 WBC
PLATELET # BLD AUTO: 222 K/UL (ref 150–400)
PMV BLD AUTO: 10.5 FL (ref 8.9–12.9)
POTASSIUM SERPL-SCNC: 4.2 MMOL/L (ref 3.5–5.1)
RBC # BLD AUTO: 3.27 M/UL (ref 4.1–5.7)
SODIUM SERPL-SCNC: 138 MMOL/L (ref 136–145)
WBC # BLD AUTO: 9.9 K/UL (ref 4.1–11.1)

## 2024-06-04 PROCEDURE — 6370000000 HC RX 637 (ALT 250 FOR IP): Performed by: NURSE PRACTITIONER

## 2024-06-04 PROCEDURE — 2580000003 HC RX 258

## 2024-06-04 PROCEDURE — 36415 COLL VENOUS BLD VENIPUNCTURE: CPT

## 2024-06-04 PROCEDURE — 83735 ASSAY OF MAGNESIUM: CPT

## 2024-06-04 PROCEDURE — 6370000000 HC RX 637 (ALT 250 FOR IP)

## 2024-06-04 PROCEDURE — 97535 SELF CARE MNGMENT TRAINING: CPT

## 2024-06-04 PROCEDURE — 80048 BASIC METABOLIC PNL TOTAL CA: CPT

## 2024-06-04 PROCEDURE — 71045 X-RAY EXAM CHEST 1 VIEW: CPT

## 2024-06-04 PROCEDURE — 6360000002 HC RX W HCPCS

## 2024-06-04 PROCEDURE — 85027 COMPLETE CBC AUTOMATED: CPT

## 2024-06-04 RX ORDER — FUROSEMIDE 20 MG/1
20 TABLET ORAL DAILY
Status: DISCONTINUED | OUTPATIENT
Start: 2024-06-04 | End: 2024-06-04 | Stop reason: HOSPADM

## 2024-06-04 RX ADMIN — ENOXAPARIN SODIUM 40 MG: 100 INJECTION SUBCUTANEOUS at 08:24

## 2024-06-04 RX ADMIN — SODIUM CHLORIDE, PRESERVATIVE FREE 10 ML: 5 INJECTION INTRAVENOUS at 08:24

## 2024-06-04 RX ADMIN — Medication 400 MG: at 08:24

## 2024-06-04 RX ADMIN — ACETAMINOPHEN 975 MG: 325 TABLET ORAL at 06:05

## 2024-06-04 RX ADMIN — GABAPENTIN 200 MG: 100 CAPSULE ORAL at 08:23

## 2024-06-04 RX ADMIN — METOPROLOL TARTRATE 12.5 MG: 25 TABLET, FILM COATED ORAL at 08:24

## 2024-06-04 RX ADMIN — AMIODARONE HYDROCHLORIDE 400 MG: 200 TABLET ORAL at 08:24

## 2024-06-04 RX ADMIN — 0.12% CHLORHEXIDINE GLUCONATE 15 ML: 1.2 RINSE ORAL at 08:23

## 2024-06-04 RX ADMIN — FUROSEMIDE 20 MG: 20 TABLET ORAL at 09:45

## 2024-06-04 ASSESSMENT — PAIN SCALES - GENERAL: PAINLEVEL_OUTOF10: 0

## 2024-06-04 NOTE — PROGRESS NOTES
Cardiac Surgery Care Coordinator-  Met with Sukhdeep Lomeli. Reviewed plan of care and discharge instructions. Reinforced move in the tube sternal precautions and continued use of the incentive spirometer.  Reviewed the importance of daily temp and weight monitoring, discussed incisional care and reviewed signs and symptoms of infection.  Red reminder bracelet given to pt.  Reviewed purpose of the bracelet and when to call the MD. Using the teach back method reviewed new medications.  Reminded pt of appts and encouraged participation in the Cardiac Wellness and rehab program after discharge.  Encouraged Sukhdeep Lomeli to verbalize and emotional support given.  Sukhdeep Lomeli is without questions or concerns at this time. Will follow up with a phone call after discharge. Di Lambert RN

## 2024-06-04 NOTE — PROGRESS NOTES
Cardiac Surgery Stepdown Progress Note    Admit Date: 2024  POD:  5 Days Post-Op    Procedure:  Procedure(s):  ON PUMP CORONARY ARTERY BYPASS GRAFTING TIMES FOUR WITH USE OF LEFT INTERNAL MAMMARY ARTERY, ENDOSCOPIC HARVESTING OF THE RIGHT GREATER SAPHENOUS VEIN (EVH BY BRANDON REYNOLDS), AND LEFT ATRIAL APPENDAGE LIGATION (E.R.A.S.). ESME BY DR. MEDINA.        Subjective:   Patient seen up in chair, seen with Dr. Chadwick. Pt feeling well and ready to go home.     On RA. Afebrile.      Objective:   Vitals:  Blood pressure 109/63, pulse 60, temperature 97.9 °F (36.6 °C), temperature source Oral, resp. rate 13, height 1.676 m (5' 6\"), weight 80.6 kg (177 lb 11.1 oz), SpO2 92 %.  Temp (24hrs), Av.4 °F (36.9 °C), Min:97.9 °F (36.6 °C), Max:99.7 °F (37.6 °C)    Oxygen Flow Rate: Room Air    Oxygen Delivery Method: None    EKG/Rhythm:  Paced    Xray Result (most recent):  XR CHEST PORTABLE 2024    Narrative  EXAM:  XR CHEST PORTABLE    INDICATION: Post op open heart surgery    COMPARISON: 6/3/2024    TECHNIQUE: portable chest AP view    FINDINGS: The patient is status post median sternotomy. Left-sided pacemaker and  left atrial appendage ligation clip are stable in position. The cardiac  silhouette is stable. The pulmonary vasculature is within normal limits.    The lungs and pleural spaces are clear. The visualized bones and upper abdomen  are age-appropriate.    Impression  No acute process. No significant interval change.      Admission Weight: Last Weight   Weight - Scale: 79.4 kg (175 lb) Weight - Scale: 80.6 kg (177 lb 11.1 oz)     Intake / Output / Drain:  Current Shift:  0701 -  1900  In: 240 [P.O.:240]  Out: -   Last 24 hrs.:   Intake/Output Summary (Last 24 hours) at 2024 0920  Last data filed at 2024 0730  Gross per 24 hour   Intake 1096 ml   Output 1275 ml   Net -179 ml       EXAM:  General:  No acute distress             Lungs:   Coarse crackles to lower on auscultation

## 2024-06-04 NOTE — PROGRESS NOTES
0700:  Bedside report received from SHANICE Mueller.     0800:  CTS rounds with Dr. Chadwick. Patient to discharge home today.

## 2024-06-04 NOTE — CARE COORDINATION
Transition of Care Plan:    RUR: 9% \"low risk\"  Prior Level of Functioning: Independent with ADL's   Disposition: Home with spouse and [facility]   If SNF or IPR: Date FOC offered: N/A  Follow up appointments: PCP/Specialists as indicated  DME needed: None  Transportation at discharge: Pt spouse to transport pt home   IM/IMM Medicare/ letter given: 2nd IM given by CM on 6/4  Is patient a [place] and connected with [facility]? No   Caregiver Contact: Naomi Lomeli (spouse), [phone]  Discharge Caregiver contacted prior to discharge? Yes, pt contacted  Care Conference needed? No  Barriers to discharge: None    1123 AM: Chart reviewed. CM provided [facility] with updated clinical information. Pt spouse to transport pt home. CM needs completed at this time.      06/04/24 1124   Services At/After Discharge   Transition of Care Consult (CM Consult) Discharge Planning   Services At/After Discharge Home Health   Mode of Transport at Discharge Other (see comment)  (Pt spouse to transport pt home.)   Hospital Transport Time of Discharge 1100   Confirm Follow Up Transport Family   Condition of Participation: Discharge Planning   The Plan for Transition of Care is related to the following treatment goals: Return home independently   The Patient and/or Patient Representative was provided with a Choice of Provider? Patient   The Patient and/Or Patient Representative agree with the Discharge Plan? Yes     TOM Briceño  Care Management  [facility]   [phone]

## 2024-06-04 NOTE — PLAN OF CARE
Problem: Discharge Planning  Goal: Discharge to home or other facility with appropriate resources  Outcome: Progressing     Problem: Respiratory - Adult  Goal: Achieves optimal ventilation and oxygenation  Outcome: Progressing     
  Problem: Discharge Planning  Goal: Discharge to home or other facility with appropriate resources  Outcome: Progressing  Flowsheets  Taken 6/1/2024 1406  Discharge to home or other facility with appropriate resources:   Identify barriers to discharge with patient and caregiver   Arrange for needed discharge resources and transportation as appropriate   Identify discharge learning needs (meds, wound care, etc)   Arrange for interpreters to assist at discharge as needed   Refer to discharge planning if patient needs post-hospital services based on physician order or complex needs related to functional status, cognitive ability or social support system  Taken 6/1/2024 0800  Discharge to home or other facility with appropriate resources:   Identify barriers to discharge with patient and caregiver   Arrange for needed discharge resources and transportation as appropriate     Problem: Respiratory - Adult  Goal: Achieves optimal ventilation and oxygenation  Outcome: Progressing  Flowsheets  Taken 6/1/2024 1406  Achieves optimal ventilation and oxygenation:   Assess for changes in respiratory status   Assess for changes in mentation and behavior   Position to facilitate oxygenation and minimize respiratory effort  Taken 6/1/2024 0800  Achieves optimal ventilation and oxygenation:   Assess for changes in mentation and behavior   Assess for changes in respiratory status     Problem: Safety - Adult  Goal: Free from fall injury  Outcome: Progressing  Flowsheets (Taken 6/1/2024 0800)  Free From Fall Injury:   Based on caregiver fall risk screen, instruct family/caregiver to ask for assistance with transferring infant if caregiver noted to have fall risk factors   Instruct family/caregiver on patient safety     Problem: Safety - Medical Restraint  Goal: Remains free of injury from restraints (Restraint for Interference with Medical Device)  Description: INTERVENTIONS:  1. Determine that other, less restrictive measures have 
  Problem: Occupational Therapy - Adult  Goal: By Discharge: Performs self-care activities at highest level of function for planned discharge setting.  See evaluation for individualized goals.  Description: FUNCTIONAL STATUS PRIOR TO ADMISSION:  pt was independent with ADLs and ambulation   Receives Help From: Family, ADL Assistance: Independent,  ,  ,  ,  ,  , Homemaking Assistance: Independent, Ambulation Assistance: Independent, Transfer Assistance: Independent, Active : Yes     HOME SUPPORT: Patient lived w/ wife.    Occupational Therapy Goals:  Initiated 5/31/2024    1.  Patient will perform ADLs standing 5 mins without fatigue or LOB with Modified [place] within 7 days.  2.  Patient will perform upper body ADLs with Modified [place] within 7 days.  3.  Patient will perform lower body ADLs with Modified [place] within 7 days.    4.  Patient will perform gathering ADL items high and low 2/2 with Modified [place] within 7 days.  5.  Patient will perform toilet transfers with Modified [place] within 7 days.  6.  Patient will perform all aspects of toileting with Modified [place] within 7 days.  7.  Patient will participate in cardiac/sternal upper extremity therapeutic exercise/activities to increase independence with ADLs with supervision/set-up for 5 minutes within 7 days.   8.  Patient will adhere to Move in the Tube precautions during functional tasks with no verbal cues within 7 days.     Outcome: Progressing    OCCUPATIONAL THERAPY TREATMENT  Patient: Sukhdeep Lomeli (78 y.o. male)  Date: 6/4/2024  Primary Diagnosis: Abnormal ballistocardiogram [R94.39]  CAD in native artery [I25.10]  Procedure(s) (LRB):  ON PUMP CORONARY ARTERY BYPASS GRAFTING TIMES FOUR WITH USE OF LEFT INTERNAL MAMMARY ARTERY, ENDOSCOPIC HARVESTING OF THE RIGHT GREATER SAPHENOUS VEIN (EVH BY BRANDON REYNOLDS), AND LEFT ATRIAL APPENDAGE LIGATION (E.R.A.S.). ESME BY DR. MEDINA. (N/A) 5 Days Post-Op 
  Problem: Physical Therapy - Adult  Goal: By Discharge: Performs mobility at highest level of function for planned discharge setting.  See evaluation for individualized goals.  Description: FUNCTIONAL STATUS PRIOR TO ADMISSION: Patient was independent and active without use of DME.    HOME SUPPORT PRIOR TO ADMISSION: The patient lived with wife.    Physical Therapy Goals  Initiated 5/31/2024  1.  Patient will move from supine to sit and sit to supine in bed with independence within 5 day(s).    2.  Patient will perform sit to stand with modified independence within 5 day(s).  3.  Patient will transfer from bed to chair and chair to bed with modified independence using the least restrictive device within 5 day(s).  4.  Patient will ambulate with modified independence for 220 feet with the least restrictive device within 5 day(s).   5.  Patient will ascend/descend 13 stairs with 1 handrail(s) with modified independence within 5 day(s).  6.  Patient will perform cardiac exercises per protocol with independence within 5 days.  7.  Patient will verbally recall and functionally demonstrate mindful-based movements (\"move in the tube\") principles without cues within 5 days.    Outcome: Progressing   PHYSICAL THERAPY EVALUATION    Patient: Sukhdeep Lomeli (78 y.o. male)  Date: 5/31/2024  Primary Diagnosis: Abnormal ballistocardiogram [R94.39]  CAD in native artery [I25.10]  Procedure(s) (LRB):  ON PUMP CORONARY ARTERY BYPASS GRAFTING TIMES FOUR WITH USE OF LEFT INTERNAL MAMMARY ARTERY, ENDOSCOPIC HARVESTING OF THE RIGHT GREATER SAPHENOUS VEIN (EVH BY BRANDON REYNOLDS), AND LEFT ATRIAL APPENDAGE LIGATION (E.R.A.S.). ESME BY DR. MEDINA. (N/A) 1 Day Post-Op   Precautions: Restrictions/Precautions: Fall Risk, Cardiac (move in the tube)                      ASSESSMENT :   DEFICITS/IMPAIRMENTS:   The patient is limited by decreased functional mobility, activity tolerance, endurance, balance     Based on the impairments listed above pt 
  Problem: Physical Therapy - Adult  Goal: By Discharge: Performs mobility at highest level of function for planned discharge setting.  See evaluation for individualized goals.  Description: FUNCTIONAL STATUS PRIOR TO ADMISSION: Patient was independent and active without use of DME.    HOME SUPPORT PRIOR TO ADMISSION: The patient lived with wife.    Physical Therapy Goals  Initiated 5/31/2024  1.  Patient will move from supine to sit and sit to supine in bed with independence within 5 day(s).    2.  Patient will perform sit to stand with modified independence within 5 day(s).  3.  Patient will transfer from bed to chair and chair to bed with modified independence using the least restrictive device within 5 day(s).  4.  Patient will ambulate with modified independence for 220 feet with the least restrictive device within 5 day(s).   5.  Patient will ascend/descend 13 stairs with 1 handrail(s) with modified independence within 5 day(s).  6.  Patient will perform cardiac exercises per protocol with independence within 5 days.  7.  Patient will verbally recall and functionally demonstrate mindful-based movements (\"move in the tube\") principles without cues within 5 days.    Outcome: Progressing   PHYSICAL THERAPY TREATMENT    Patient: Sukhdeep Lomeli (78 y.o. male)  Date: 6/2/2024  Diagnosis: Abnormal ballistocardiogram [R94.39]  CAD in native artery [I25.10] CAD in native artery  Procedure(s) (LRB):  ON PUMP CORONARY ARTERY BYPASS GRAFTING TIMES FOUR WITH USE OF LEFT INTERNAL MAMMARY ARTERY, ENDOSCOPIC HARVESTING OF THE RIGHT GREATER SAPHENOUS VEIN (EVH BY BRANDON REYNOLDS), AND LEFT ATRIAL APPENDAGE LIGATION (E.R.A.S.). ESME BY DR. MEDINA. (N/A) 3 Days Post-Op  Precautions: Fall Risk, Cardiac (move in the tube)                      ASSESSMENT:  Patient continues to benefit from skilled PT services and is progressing towards goals. Patient received sitting in the chair, agreeable and motivated for therapy. Patient required 
baseline bowel function  Outcome: Adequate for Discharge     Problem: Genitourinary - Adult  Goal: Absence of urinary retention  Outcome: Adequate for Discharge     Problem: Infection - Adult  Goal: Absence of infection at discharge  Outcome: Adequate for Discharge     Problem: Metabolic/Fluid and Electrolytes - Adult  Goal: Electrolytes maintained within normal limits  Outcome: Adequate for Discharge  Goal: Hemodynamic stability and optimal renal function maintained  Outcome: Adequate for Discharge     
with chest tube, alan and IV. Progressing really well and expect to do well with stair training tomorrow. Performed all exercises in standing and ambulated 2 laps in the melgoza with rollator, mainly for equipment management rather than balance.          PLAN:  Patient continues to benefit from skilled intervention to address the above impairments.  Continue treatment per established plan of care.    Recommend for next PT session: no use of DME and stair training    Recommendation for discharge: (in order for the patient to meet his/her long term goals): Therapy 2x a week in the home    Other factors to consider for discharge: no additional factors    IF patient discharges home will need the following DME: none       SUBJECTIVE:   Patient stated, \"I wanna do more than 3 walks.\"    OBJECTIVE DATA SUMMARY:   Critical Behavior:  Orientation  Overall Orientation Status: Within Normal Limits  Orientation Level: Oriented X4       Functional Mobility Training:  Bed Mobility:     Transfers:  Transfer Training  Sit to Stand: Stand-by assistance  Stand to Sit: Stand-by assistance  Balance:  Balance  Sitting: Intact  Standing: With support;Intact   Ambulation/Gait Training:     Gait  Overall Level of Assistance: Stand-by assistance  Distance (ft): 440 Feet  Assistive Device: Walker, rollator  Speed/Manju: Slow  Step Length: Right shortened;Left shortened  Gait Abnormalities: Decreased step clearance        Neuro Re-Education:                                                                                                                                                                                                                                         Intervention/Education specific to: \"Move in the tube\"  Patient mobilized on continuous portable monitor/telemetry.    The patient is demonstrating understanding of mindful-based movements (\"move in the tube\") principles of keeping UEs proximal to ribcage to prevent lateral 
elbows close to rib cage when performing any load-bearing activity.  Educated on applying this concept when getting in/out of bed, pushing up from a chair, opening a door, or lifting a box.  Patient has been provided handouts with diagrams of each correct/incorrect method of performing each of the above tasks.     Patient instructed on the ability to utilize upper extremities “outside the tube” when doing any non-load bearing activity such as washing hair/body, brushing teeth, retrieving clothing items, or scratching your back. Patient encouraged to also perform upper extremity exercises \"outside of the tube\" to prevent scar tissue formation around sternal incision site.    Patient instructed on no asymmetrical reaching over head to ensure BUEs are lifted together above 90* of shoulder flexion.    Upper Body Dressing Education:    Pullover Shirt:     -place pull over shirt face down, thread bilateral UE through sleeves and pull up to mid humerus   -grasp shirt with bilateral hands at neck and bottom of shirt    -bring over head with both arms going up at the same time  -rotate at waist with shoulder following hip motion to pull shirt tail down    Pt was able to don pull over shirt with Stand by Assist.      Open front shirt:   -pull shirt over one arm first   -reaching symmetrically with both UE over head to grasp collar of shirt   -thread shirt over other arm  -doff by grabbing at collar with bilateral hands over head and pulling shirt over head    Lower Body Dressing:  Educated on the benefits of donning clothing tailor sitting and don all clothing while sitting prior to standing. Patient demonstrated lower body dressing with Stand by Assist. Instructed on and indicated good understanding on the benefits of loose clothing throughout to accommodate for post surgical swelling, decreased ROM and increased pain.             Pain Ratin/10   Pain Intervention(s):         Activity Tolerance:   Good and SpO2 stable 
[]                                Shoulder abduction  1  5  [x]                             []                             []                             []                                Scapular elevation  1  10  [x]                             []                              []                             []                                Scapular retraction  1  10 [x]                             []                             []                             []                                Trunk rotation  1  10  [x]                             []                             []                             []                                Trunk sidebending  1  10  [x]                             []                              []                             []                                                                                                                                                                                                                                                                                Barthel Index:    Barthel Index Scale  Feeding: Independent, Able to apply any necessary device. Feeds in reasonable time  Bathing: Cannot perform activity  Grooming: Washes face, joyner hair, brushes teeth, shaves (manages plug if electric razor)  Dressing: Needs help, but does at least half of task within reasonable time  Bowel Control: Occasional accidents or needs help with device  Bladder Control: Cannot perform activity  Toilet Transfers: Needs help for balance, handling clothes or toilet paper  Chair/Bed Trannsfers: Minimum assistance or supervision required  Ambulation: With help for 50 yards  Stairs: Cannot perform activity  Total Barthel Index Score: 50       The Barthel ADL Index: Guidelines  1. The index should be used as a record of what a patient does, not as a record of what a patient could do.  2. The main aim is to establish degree of independence from any help, physical or

## 2024-06-04 NOTE — PROGRESS NOTES
[id number]  Patient RR 20-30, Temp 99.7 orally, Clear lung sound and diminished at bases, patient re instructed about I/S he pull around 750-1000 ml, to follow up,    [id number]  Patient seems to have sleep apnea > RR 19-22 but shallow specially when he sleep on his back, sat drop to 76% > when he up sat up to 95% > he said he been tested for sleep apnea and they recommended to not sleep in his back, NC 1 L/min applied,     [id number]  Patient desat to 84% again while sleeping semi eller in his back with NC 1 L/min, RR 19 but shallow for a minutes then back up to 94%, patient up to bathroom then walked in hallway twice then back to chair,        0700  Bedside and Verbal shift change report given to SHANICE Lerma (oncoming nurse) by SHANICE Mueller (offgoing nurse). Report included the following information SBAR, Kardex, Intake/Output, MAR, Accordion, Recent Results, Med Rec Status and Cardiac Rhythm AV Pacing.       No

## 2024-06-05 LAB
HGB BLD-MCNC: 11 G/DL (ref 12.1–17)
HGB BLD-MCNC: 11 G/DL (ref 12.1–17)
HGB BLD-MCNC: 11.4 G/DL (ref 12.1–17)
HGB BLD-MCNC: 11.6 G/DL (ref 12.1–17)
HGB BLD-MCNC: 12 G/DL (ref 12.1–17)
HGB BLD-MCNC: 12.8 G/DL (ref 12.1–17)
HGB BLD-MCNC: 13.8 G/DL (ref 12.1–17)
HGB BLD-MCNC: 14.2 G/DL (ref 12.1–17)

## 2024-06-06 ENCOUNTER — TELEPHONE (OUTPATIENT)
Facility: HOSPITAL | Age: 79
End: 2024-06-06

## 2024-06-06 NOTE — TELEPHONE ENCOUNTER
"Mid sternal CP x \"all day\". Pt states that earlier the pn radiated down left arm. Pt rates 5/10. Pt self administered 324mg BASA and nitro SL prior to EMS arrival.   " Cardiac Surgery Discharge - Follow up call placed to Sukhdeep Lomeli Jr.. Reviewed plan of care after discharge and encouraged Sukhdeep Lomeli Jr. to verbalize.  Discussed precautions and reviewed medications.  Patient without questions regarding medications. Encouraged continued use of the incentive spirometer, daily weights and temp. He is showering and walking. Home health is scheduled to see pt tomorrow..  Confirmed follow up appts and reinforced importance of wearing red reminder bracelet. Sukhdeep Lomeli Jr. is without questions or concerns. Di Lambert RN

## 2024-06-11 ENCOUNTER — OFFICE VISIT (OUTPATIENT)
Age: 79
End: 2024-06-11

## 2024-06-11 VITALS
OXYGEN SATURATION: 96 % | BODY MASS INDEX: 27.99 KG/M2 | SYSTOLIC BLOOD PRESSURE: 115 MMHG | HEART RATE: 60 BPM | DIASTOLIC BLOOD PRESSURE: 65 MMHG | TEMPERATURE: 97.5 F | WEIGHT: 173.4 LBS

## 2024-06-11 DIAGNOSIS — Z95.1 S/P CABG (CORONARY ARTERY BYPASS GRAFT): Primary | ICD-10-CM

## 2024-06-11 PROCEDURE — 99024 POSTOP FOLLOW-UP VISIT: CPT | Performed by: THORACIC SURGERY (CARDIOTHORACIC VASCULAR SURGERY)

## 2024-06-11 RX ORDER — SILDENAFIL 50 MG/1
1 TABLET, FILM COATED ORAL DAILY PRN
COMMUNITY

## 2024-06-11 NOTE — PROGRESS NOTES
Name: Sukhdeep Lomeli Jr.   : 1945   Home Phone: [phone]     Reviewed record in preparation for visit and have obtained necessary documentation. Identified pt with two pt identifiers (name and ).     1. Have you been to the ER, urgent care clinic since your last visit?  Hospitalized since your last visit?No    2. Have you seen or consulted any other health care providers outside of the [facility] since your last visit?  Include any pap smears or colon screening. No      Sukhdeep Lomeli Jr. is being seen for CABG follow up approximately “One Week post-operatively.     Patient counseled on Wound care, Diet, ERAS Protocol, Signs of infection, and Office contact information and instructed to follow up  in approximately one month. Patient given opportunity to ask questions and receive clarification.     Current Medications-Reviewed with Patient    Allergies-Reviewed with Patient    Sternotomy dressing removed with no complications. Image uploaded to patients media tab    Vitals  /65   Pulse 60   Temp 97.5 °F (36.4 °C) (Oral)   Wt 78.7 kg (173 lb 6.4 oz)   SpO2 96%   BMI 27.99 kg/m²           
yes  Walking: 10 minutes X2       
Complex Repair And Modified Advancement Flap Text: The defect edges were debeveled with a #15 scalpel blade.  The primary defect was closed partially with a complex linear closure.  Given the location of the remaining defect, shape of the defect and the proximity to free margins a modified advancement flap was deemed most appropriate for complete closure of the defect.  Using a sterile surgical marker, an appropriate advancement flap was drawn incorporating the defect and placing the expected incisions within the relaxed skin tension lines where possible.    The area thus outlined was incised deep to adipose tissue with a #15 scalpel blade.  The skin margins were undermined to an appropriate distance in all directions utilizing iris scissors.

## 2024-06-24 RX ORDER — AMIODARONE HYDROCHLORIDE 200 MG/1
TABLET ORAL
Qty: 252 TABLET | Refills: 1 | OUTPATIENT
Start: 2024-06-24

## 2024-06-28 PROBLEM — Z01.810 PREOP CARDIOVASCULAR EXAM: Status: RESOLVED | Noted: 2024-05-29 | Resolved: 2024-06-28

## 2024-07-02 ENCOUNTER — OFFICE VISIT (OUTPATIENT)
Age: 79
End: 2024-07-02

## 2024-07-02 VITALS
DIASTOLIC BLOOD PRESSURE: 72 MMHG | SYSTOLIC BLOOD PRESSURE: 126 MMHG | BODY MASS INDEX: 28.34 KG/M2 | HEART RATE: 60 BPM | OXYGEN SATURATION: 96 % | TEMPERATURE: 97.9 F | WEIGHT: 175.6 LBS

## 2024-07-02 DIAGNOSIS — Z95.1 S/P CABG X 4: Primary | ICD-10-CM

## 2024-07-02 DIAGNOSIS — Z98.890 S/P LEFT ATRIAL APPENDAGE LIGATION: ICD-10-CM

## 2024-07-02 RX ORDER — METOPROLOL SUCCINATE 25 MG/1
25 TABLET, EXTENDED RELEASE ORAL DAILY
COMMUNITY
Start: 2024-06-20

## 2024-07-02 NOTE — PROGRESS NOTES
mouth daily for 2 weeks, then stop. (Patient not taking: Reported on 7/2/2024) 84 tablet 0    melatonin 3 MG TABS tablet Take 1 tablet by mouth nightly as needed (sleep) (Patient not taking: Reported on 7/2/2024) 30 tablet 0     No current facility-administered medications for this visit.       Vitals: Blood pressure 126/72, pulse 60, temperature 97.9 °F (36.6 °C), temperature source Oral, weight 79.7 kg (175 lb 9.6 oz), SpO2 96 %.       Allergies: has No Known Allergies.    Physical Exam:  Wounds: clean, dry, healed    Lungs: clear to auscultation bilaterally    Heart: regular rate and rhythm, S1, S2 normal, no murmur, click, rub or gallop    Extremities: no edema    Assessment/Plan:   CAD s/p CABGx4, LAAL. On ASA, Statin, BB.  prophylactic Amiodarone completed.    HTN: On metoprolol, continue. He was previously on lisinopril and HCTZ.     HLD: on statin, continue   Hx second degree HB st PPM, noted  Dilated ascending aorta: CTA completed shows 4 cm ascending aorta. FU with PCP/Cardiologist. Strict BP control.   Possible PAH: 3.2 cm main pulmonary trunk, FU with PCP    Pt is ready to start cardiac rehab. FU with PCP and Cardiologist (sees him next week - instructed to take his medication bottles with him to the appt).     Rehab - July 11th  Walking: yes  Glucometer: n/a  Antibiotic card for valves: n/a

## 2024-07-02 NOTE — PATIENT INSTRUCTIONS
Keep blood pressure less than 140. Please notify your PCP or Cardiologist if you are consistently staying above this.     Sternal precautions (Move in the Tube) remain for 90 days.     You may begin pushing, pulling, picking up up to 10lbs with BOTH hands for the next 30 days. If you do not have any popping, clicking, rubbing or pain, after the next 30 days, you may increase up to 15-20lbs for your final 30 days.     Do not fully submerge in water until all scabs have fallen off.

## 2024-07-11 ENCOUNTER — HOSPITAL ENCOUNTER (OUTPATIENT)
Facility: HOSPITAL | Age: 79
Setting detail: RECURRING SERIES
Discharge: HOME | End: 2024-07-14
Attending: THORACIC SURGERY (CARDIOTHORACIC VASCULAR SURGERY)
Payer: MEDICARE

## 2024-07-11 VITALS — BODY MASS INDEX: 28.08 KG/M2 | WEIGHT: 174 LBS

## 2024-07-11 PROCEDURE — 93797 PHYS/QHP OP CAR RHAB WO ECG: CPT

## 2024-07-11 PROCEDURE — 93798 PHYS/QHP OP CAR RHAB W/ECG: CPT

## 2024-07-11 RX ORDER — LISINOPRIL 2.5 MG/1
2.5 TABLET ORAL DAILY
COMMUNITY

## 2024-07-11 ASSESSMENT — EXERCISE STRESS TEST
PEAK_RPE: 12
PEAK_BP: 180/70
PEAK_HR: 69
PEAK_HR: 69
PEAK_RPE: 12
PEAK_METS: 2.2
PEAK_BP: 180/70
PEAK_BP: 180/70

## 2024-07-11 ASSESSMENT — LIFESTYLE VARIABLES: ALCOHOL_USE: DAILY

## 2024-07-11 ASSESSMENT — EJECTION FRACTION
EF_VALUE: 55
EF_VALUE: 55

## 2024-07-15 ENCOUNTER — HOSPITAL ENCOUNTER (OUTPATIENT)
Facility: HOSPITAL | Age: 79
Setting detail: RECURRING SERIES
Discharge: HOME | End: 2024-07-18
Attending: THORACIC SURGERY (CARDIOTHORACIC VASCULAR SURGERY)
Payer: MEDICARE

## 2024-07-15 VITALS — WEIGHT: 174.8 LBS | BODY MASS INDEX: 28.21 KG/M2

## 2024-07-15 PROCEDURE — 93798 PHYS/QHP OP CAR RHAB W/ECG: CPT

## 2024-07-15 ASSESSMENT — EXERCISE STRESS TEST
PEAK_RPE: 13
PEAK_METS: 2.2
PEAK_HR: 85

## 2024-07-16 ENCOUNTER — HOSPITAL ENCOUNTER (OUTPATIENT)
Facility: HOSPITAL | Age: 79
Setting detail: RECURRING SERIES
Discharge: HOME | End: 2024-07-19
Attending: THORACIC SURGERY (CARDIOTHORACIC VASCULAR SURGERY)
Payer: MEDICARE

## 2024-07-16 VITALS — WEIGHT: 175.6 LBS | BODY MASS INDEX: 28.34 KG/M2

## 2024-07-16 PROCEDURE — 93798 PHYS/QHP OP CAR RHAB W/ECG: CPT

## 2024-07-16 ASSESSMENT — EXERCISE STRESS TEST
PEAK_HR: 73
PEAK_METS: 2.2
PEAK_RPE: 13

## 2024-07-17 ENCOUNTER — HOSPITAL ENCOUNTER (OUTPATIENT)
Facility: HOSPITAL | Age: 79
Setting detail: RECURRING SERIES
Discharge: HOME | End: 2024-07-20
Attending: THORACIC SURGERY (CARDIOTHORACIC VASCULAR SURGERY)
Payer: MEDICARE

## 2024-07-17 VITALS — BODY MASS INDEX: 28.41 KG/M2 | WEIGHT: 176 LBS

## 2024-07-17 PROCEDURE — 93798 PHYS/QHP OP CAR RHAB W/ECG: CPT

## 2024-07-17 ASSESSMENT — EXERCISE STRESS TEST
PEAK_METS: 2.2
PEAK_HR: 107
PEAK_RPE: 13

## 2024-07-22 ENCOUNTER — HOSPITAL ENCOUNTER (OUTPATIENT)
Facility: HOSPITAL | Age: 79
Setting detail: RECURRING SERIES
Discharge: HOME | End: 2024-07-25
Attending: THORACIC SURGERY (CARDIOTHORACIC VASCULAR SURGERY)
Payer: MEDICARE

## 2024-07-22 VITALS — WEIGHT: 176 LBS | BODY MASS INDEX: 28.41 KG/M2

## 2024-07-22 PROCEDURE — 93798 PHYS/QHP OP CAR RHAB W/ECG: CPT

## 2024-07-23 ENCOUNTER — HOSPITAL ENCOUNTER (OUTPATIENT)
Facility: HOSPITAL | Age: 79
Setting detail: RECURRING SERIES
Discharge: HOME | End: 2024-07-26
Attending: THORACIC SURGERY (CARDIOTHORACIC VASCULAR SURGERY)
Payer: MEDICARE

## 2024-07-23 VITALS — BODY MASS INDEX: 28.08 KG/M2 | WEIGHT: 174 LBS

## 2024-07-23 PROCEDURE — 93798 PHYS/QHP OP CAR RHAB W/ECG: CPT

## 2024-07-23 PROCEDURE — 93797 PHYS/QHP OP CAR RHAB WO ECG: CPT

## 2024-07-23 NOTE — CARDIO/PULMONARY
plant-based).    Briefly reviewed with Sukhdeep Lomeli Jr. the nutrition information in the Cardiac Rehab patient education book and encouraged Sukhdeep Lomeli Jr. to read thoroughly, ask questions as needed, and use for future reference for heart healthy nutrition information.    Supplemental handouts provided: cookbook.     Sukhdeep Lomeli Jr. is  scheduled to participate in Cardiac Rehab group nutrition classes.      PATIENT GOALS:    Weight Goals: lose a few more lbs     Nutrition Goals:  Daily Recommendations:  Calories: 1400 /day  (for weight loss)  Saturated Fat: no more than 14 g/day  Trans Fat: 0 g/day  Sodium: no more than 2000 mg/day  Fruit: 2 cups / day  Vegetables: 3 cups/day    Other:  - read and compare food labels      Keeping a food diary was recommended.            Questions addressed. Follow-up plans discussed. Sukhdeep Lomeli Jr. verbalized understanding.    Rogelio Bain RD

## 2024-07-24 ENCOUNTER — HOSPITAL ENCOUNTER (OUTPATIENT)
Facility: HOSPITAL | Age: 79
Setting detail: RECURRING SERIES
Discharge: HOME | End: 2024-07-27
Attending: THORACIC SURGERY (CARDIOTHORACIC VASCULAR SURGERY)
Payer: MEDICARE

## 2024-07-24 VITALS — BODY MASS INDEX: 28.08 KG/M2 | WEIGHT: 174 LBS

## 2024-07-24 PROCEDURE — 93798 PHYS/QHP OP CAR RHAB W/ECG: CPT

## 2024-07-29 ENCOUNTER — HOSPITAL ENCOUNTER (OUTPATIENT)
Facility: HOSPITAL | Age: 79
Setting detail: RECURRING SERIES
Discharge: HOME | End: 2024-08-01
Attending: THORACIC SURGERY (CARDIOTHORACIC VASCULAR SURGERY)
Payer: MEDICARE

## 2024-07-29 VITALS — BODY MASS INDEX: 28.41 KG/M2 | WEIGHT: 176 LBS

## 2024-07-29 PROCEDURE — 93798 PHYS/QHP OP CAR RHAB W/ECG: CPT

## 2024-07-29 ASSESSMENT — EXERCISE STRESS TEST
PEAK_RPE: 13
PEAK_METS: 2.4
PEAK_HR: 65

## 2024-07-30 ENCOUNTER — HOSPITAL ENCOUNTER (OUTPATIENT)
Facility: HOSPITAL | Age: 79
Setting detail: RECURRING SERIES
Discharge: HOME | End: 2024-08-02
Attending: THORACIC SURGERY (CARDIOTHORACIC VASCULAR SURGERY)
Payer: MEDICARE

## 2024-07-30 VITALS — WEIGHT: 175 LBS | BODY MASS INDEX: 28.25 KG/M2

## 2024-07-30 PROCEDURE — 93798 PHYS/QHP OP CAR RHAB W/ECG: CPT

## 2024-07-30 ASSESSMENT — EXERCISE STRESS TEST
PEAK_METS: 2.4
PEAK_HR: 84
PEAK_RPE: 13

## 2024-07-31 ENCOUNTER — HOSPITAL ENCOUNTER (OUTPATIENT)
Facility: HOSPITAL | Age: 79
Setting detail: RECURRING SERIES
Discharge: HOME | End: 2024-08-03
Attending: THORACIC SURGERY (CARDIOTHORACIC VASCULAR SURGERY)
Payer: MEDICARE

## 2024-07-31 VITALS — WEIGHT: 175 LBS | BODY MASS INDEX: 28.25 KG/M2

## 2024-07-31 PROCEDURE — 93798 PHYS/QHP OP CAR RHAB W/ECG: CPT

## 2024-07-31 ASSESSMENT — EXERCISE STRESS TEST
PEAK_METS: 2.6
PEAK_RPE: 13
PEAK_HR: 82

## 2024-08-05 ENCOUNTER — HOSPITAL ENCOUNTER (OUTPATIENT)
Facility: HOSPITAL | Age: 79
Setting detail: RECURRING SERIES
Discharge: HOME | End: 2024-08-08
Attending: THORACIC SURGERY (CARDIOTHORACIC VASCULAR SURGERY)
Payer: MEDICARE

## 2024-08-05 VITALS — WEIGHT: 175 LBS | BODY MASS INDEX: 28.25 KG/M2

## 2024-08-05 PROCEDURE — 93798 PHYS/QHP OP CAR RHAB W/ECG: CPT

## 2024-08-05 ASSESSMENT — EXERCISE STRESS TEST
PEAK_RPE: 13
PEAK_RPD: 0
PEAK_METS: 3.1
PEAK_HR: 63

## 2024-08-05 ASSESSMENT — LIFESTYLE VARIABLES: ALCOHOL_USE: DAILY

## 2024-08-05 ASSESSMENT — EJECTION FRACTION: EF_VALUE: 55

## 2024-08-06 ENCOUNTER — HOSPITAL ENCOUNTER (OUTPATIENT)
Facility: HOSPITAL | Age: 79
Setting detail: RECURRING SERIES
Discharge: HOME | End: 2024-08-09
Attending: THORACIC SURGERY (CARDIOTHORACIC VASCULAR SURGERY)
Payer: MEDICARE

## 2024-08-06 VITALS — WEIGHT: 175 LBS | BODY MASS INDEX: 28.25 KG/M2

## 2024-08-06 PROCEDURE — 93798 PHYS/QHP OP CAR RHAB W/ECG: CPT

## 2024-08-06 ASSESSMENT — EXERCISE STRESS TEST
PEAK_RPE: 13
PEAK_METS: 3.1
PEAK_HR: 77

## 2024-08-07 ENCOUNTER — HOSPITAL ENCOUNTER (OUTPATIENT)
Facility: HOSPITAL | Age: 79
Setting detail: RECURRING SERIES
Discharge: HOME | End: 2024-08-10
Attending: THORACIC SURGERY (CARDIOTHORACIC VASCULAR SURGERY)
Payer: MEDICARE

## 2024-08-07 VITALS — BODY MASS INDEX: 28.25 KG/M2 | WEIGHT: 175 LBS

## 2024-08-07 PROCEDURE — 93798 PHYS/QHP OP CAR RHAB W/ECG: CPT

## 2024-08-07 ASSESSMENT — EXERCISE STRESS TEST
PEAK_RPE: 13
PEAK_HR: 69
PEAK_METS: 3.1

## 2024-08-14 ENCOUNTER — HOSPITAL ENCOUNTER (OUTPATIENT)
Facility: HOSPITAL | Age: 79
Setting detail: RECURRING SERIES
Discharge: HOME | End: 2024-08-17
Attending: THORACIC SURGERY (CARDIOTHORACIC VASCULAR SURGERY)
Payer: MEDICARE

## 2024-08-14 VITALS — BODY MASS INDEX: 28.41 KG/M2 | WEIGHT: 176 LBS

## 2024-08-14 PROCEDURE — 93798 PHYS/QHP OP CAR RHAB W/ECG: CPT

## 2024-08-14 ASSESSMENT — EXERCISE STRESS TEST
PEAK_METS: 3.1
PEAK_HR: 86
PEAK_RPE: DID NOT RECORD

## 2024-08-15 ENCOUNTER — HOSPITAL ENCOUNTER (OUTPATIENT)
Facility: HOSPITAL | Age: 79
Setting detail: RECURRING SERIES
Discharge: HOME | End: 2024-08-18
Attending: THORACIC SURGERY (CARDIOTHORACIC VASCULAR SURGERY)
Payer: MEDICARE

## 2024-08-15 VITALS — BODY MASS INDEX: 28.25 KG/M2 | WEIGHT: 175 LBS

## 2024-08-15 PROCEDURE — 93798 PHYS/QHP OP CAR RHAB W/ECG: CPT

## 2024-08-15 ASSESSMENT — EXERCISE STRESS TEST
PEAK_HR: 80
PEAK_RPE: DID NOT RECORD
PEAK_METS: 3.1

## 2024-08-16 ENCOUNTER — HOSPITAL ENCOUNTER (OUTPATIENT)
Facility: HOSPITAL | Age: 79
Setting detail: RECURRING SERIES
Discharge: HOME | End: 2024-08-19
Attending: THORACIC SURGERY (CARDIOTHORACIC VASCULAR SURGERY)
Payer: MEDICARE

## 2024-08-16 VITALS — BODY MASS INDEX: 28.41 KG/M2 | WEIGHT: 176 LBS

## 2024-08-16 PROCEDURE — 93798 PHYS/QHP OP CAR RHAB W/ECG: CPT

## 2024-08-16 ASSESSMENT — EXERCISE STRESS TEST
PEAK_RPE: DID NOT RECORD
PEAK_METS: 3.1
PEAK_HR: 79

## 2024-08-19 ENCOUNTER — HOSPITAL ENCOUNTER (OUTPATIENT)
Facility: HOSPITAL | Age: 79
Setting detail: RECURRING SERIES
Discharge: HOME | End: 2024-08-22
Attending: THORACIC SURGERY (CARDIOTHORACIC VASCULAR SURGERY)
Payer: MEDICARE

## 2024-08-19 VITALS — WEIGHT: 177 LBS | BODY MASS INDEX: 28.57 KG/M2

## 2024-08-19 PROCEDURE — 93798 PHYS/QHP OP CAR RHAB W/ECG: CPT

## 2024-08-19 PROCEDURE — 93797 PHYS/QHP OP CAR RHAB WO ECG: CPT

## 2024-08-19 ASSESSMENT — EXERCISE STRESS TEST
PEAK_HR: 84
PEAK_METS: 3.1
PEAK_RPE: 13

## 2024-08-20 ENCOUNTER — HOSPITAL ENCOUNTER (OUTPATIENT)
Facility: HOSPITAL | Age: 79
Setting detail: RECURRING SERIES
Discharge: HOME | End: 2024-08-23
Attending: THORACIC SURGERY (CARDIOTHORACIC VASCULAR SURGERY)
Payer: MEDICARE

## 2024-08-20 VITALS — BODY MASS INDEX: 28.57 KG/M2 | WEIGHT: 177 LBS

## 2024-08-20 PROCEDURE — 93798 PHYS/QHP OP CAR RHAB W/ECG: CPT

## 2024-08-20 ASSESSMENT — EXERCISE STRESS TEST
PEAK_HR: 77
PEAK_METS: 3.1
PEAK_RPE: 13

## 2024-08-21 ENCOUNTER — HOSPITAL ENCOUNTER (OUTPATIENT)
Facility: HOSPITAL | Age: 79
Setting detail: RECURRING SERIES
Discharge: HOME | End: 2024-08-24
Attending: THORACIC SURGERY (CARDIOTHORACIC VASCULAR SURGERY)
Payer: MEDICARE

## 2024-08-21 VITALS — WEIGHT: 176 LBS | BODY MASS INDEX: 28.41 KG/M2

## 2024-08-21 PROCEDURE — 93798 PHYS/QHP OP CAR RHAB W/ECG: CPT

## 2024-08-21 ASSESSMENT — EXERCISE STRESS TEST
PEAK_RPE: 13
PEAK_METS: 3.1
PEAK_HR: 77

## 2024-08-26 ENCOUNTER — HOSPITAL ENCOUNTER (OUTPATIENT)
Facility: HOSPITAL | Age: 79
Setting detail: RECURRING SERIES
Discharge: HOME | End: 2024-08-29
Attending: THORACIC SURGERY (CARDIOTHORACIC VASCULAR SURGERY)
Payer: MEDICARE

## 2024-08-26 VITALS — BODY MASS INDEX: 28.41 KG/M2 | WEIGHT: 176 LBS

## 2024-08-26 PROCEDURE — 93797 PHYS/QHP OP CAR RHAB WO ECG: CPT

## 2024-08-26 PROCEDURE — 93798 PHYS/QHP OP CAR RHAB W/ECG: CPT

## 2024-08-26 ASSESSMENT — EXERCISE STRESS TEST
PEAK_RPE: 13
PEAK_HR: 85
PEAK_METS: 3.1

## 2024-08-27 ENCOUNTER — HOSPITAL ENCOUNTER (OUTPATIENT)
Facility: HOSPITAL | Age: 79
Setting detail: RECURRING SERIES
Discharge: HOME | End: 2024-08-30
Attending: THORACIC SURGERY (CARDIOTHORACIC VASCULAR SURGERY)
Payer: MEDICARE

## 2024-08-27 VITALS — WEIGHT: 177 LBS | BODY MASS INDEX: 28.57 KG/M2

## 2024-08-27 PROCEDURE — 93798 PHYS/QHP OP CAR RHAB W/ECG: CPT

## 2024-08-27 ASSESSMENT — EXERCISE STRESS TEST
PEAK_METS: 3.1
PEAK_RPE: 12
PEAK_HR: 74

## 2024-08-27 ASSESSMENT — LIFESTYLE VARIABLES: ALCOHOL_USE: DAILY

## 2024-08-27 ASSESSMENT — EJECTION FRACTION: EF_VALUE: 55

## 2024-08-28 ENCOUNTER — HOSPITAL ENCOUNTER (OUTPATIENT)
Facility: HOSPITAL | Age: 79
Setting detail: RECURRING SERIES
Discharge: HOME | End: 2024-08-31
Attending: THORACIC SURGERY (CARDIOTHORACIC VASCULAR SURGERY)
Payer: MEDICARE

## 2024-08-28 VITALS — WEIGHT: 176 LBS | BODY MASS INDEX: 28.41 KG/M2

## 2024-08-28 PROCEDURE — 93798 PHYS/QHP OP CAR RHAB W/ECG: CPT

## 2024-08-28 ASSESSMENT — EXERCISE STRESS TEST
PEAK_METS: 3.8
PEAK_HR: 80
PEAK_RPE: 13

## 2024-08-30 RX ORDER — ATORVASTATIN CALCIUM 40 MG/1
40 TABLET, FILM COATED ORAL DAILY
Qty: 90 TABLET | Refills: 1 | OUTPATIENT
Start: 2024-08-30

## 2024-09-03 ENCOUNTER — HOSPITAL ENCOUNTER (OUTPATIENT)
Facility: HOSPITAL | Age: 79
Setting detail: RECURRING SERIES
Discharge: HOME | End: 2024-09-06
Attending: THORACIC SURGERY (CARDIOTHORACIC VASCULAR SURGERY)
Payer: MEDICARE

## 2024-09-03 VITALS — WEIGHT: 178 LBS | BODY MASS INDEX: 28.73 KG/M2

## 2024-09-03 PROCEDURE — 93798 PHYS/QHP OP CAR RHAB W/ECG: CPT

## 2024-09-03 ASSESSMENT — EXERCISE STRESS TEST
PEAK_METS: 3.8
PEAK_RPE: 13
PEAK_HR: 77

## 2024-09-04 ENCOUNTER — HOSPITAL ENCOUNTER (OUTPATIENT)
Facility: HOSPITAL | Age: 79
Setting detail: RECURRING SERIES
Discharge: HOME | End: 2024-09-07
Attending: THORACIC SURGERY (CARDIOTHORACIC VASCULAR SURGERY)
Payer: MEDICARE

## 2024-09-04 VITALS — BODY MASS INDEX: 28.73 KG/M2 | WEIGHT: 178 LBS

## 2024-09-04 PROCEDURE — 93798 PHYS/QHP OP CAR RHAB W/ECG: CPT

## 2024-09-04 ASSESSMENT — EXERCISE STRESS TEST
PEAK_HR: 78
PEAK_RPE: 13
PEAK_METS: 3.8

## 2024-09-09 ENCOUNTER — HOSPITAL ENCOUNTER (OUTPATIENT)
Facility: HOSPITAL | Age: 79
Setting detail: RECURRING SERIES
Discharge: HOME | End: 2024-09-12
Attending: THORACIC SURGERY (CARDIOTHORACIC VASCULAR SURGERY)
Payer: MEDICARE

## 2024-09-09 VITALS — WEIGHT: 178 LBS | BODY MASS INDEX: 28.73 KG/M2

## 2024-09-09 PROCEDURE — 93798 PHYS/QHP OP CAR RHAB W/ECG: CPT

## 2024-09-09 PROCEDURE — 93797 PHYS/QHP OP CAR RHAB WO ECG: CPT

## 2024-09-09 ASSESSMENT — EXERCISE STRESS TEST
PEAK_HR: 109
PEAK_METS: 3.8
PEAK_RPE: 13

## 2024-09-10 ENCOUNTER — HOSPITAL ENCOUNTER (OUTPATIENT)
Facility: HOSPITAL | Age: 79
Setting detail: RECURRING SERIES
Discharge: HOME | End: 2024-09-13
Attending: THORACIC SURGERY (CARDIOTHORACIC VASCULAR SURGERY)
Payer: MEDICARE

## 2024-09-10 VITALS — WEIGHT: 178 LBS | BODY MASS INDEX: 28.73 KG/M2

## 2024-09-10 PROCEDURE — 93798 PHYS/QHP OP CAR RHAB W/ECG: CPT

## 2024-09-10 RX ORDER — AMIODARONE HYDROCHLORIDE 200 MG/1
TABLET ORAL
Qty: 84 TABLET | Refills: 0 | OUTPATIENT
Start: 2024-09-10

## 2024-09-10 ASSESSMENT — EXERCISE STRESS TEST
PEAK_RPE: 13
PEAK_HR: 84
PEAK_METS: 3.8

## 2024-09-11 ENCOUNTER — APPOINTMENT (OUTPATIENT)
Facility: HOSPITAL | Age: 79
End: 2024-09-11
Attending: THORACIC SURGERY (CARDIOTHORACIC VASCULAR SURGERY)
Payer: MEDICARE

## 2024-09-16 ENCOUNTER — APPOINTMENT (OUTPATIENT)
Facility: HOSPITAL | Age: 79
End: 2024-09-16
Attending: THORACIC SURGERY (CARDIOTHORACIC VASCULAR SURGERY)
Payer: MEDICARE

## 2024-09-17 ENCOUNTER — APPOINTMENT (OUTPATIENT)
Facility: HOSPITAL | Age: 79
End: 2024-09-17
Attending: THORACIC SURGERY (CARDIOTHORACIC VASCULAR SURGERY)
Payer: MEDICARE

## 2024-09-18 ENCOUNTER — APPOINTMENT (OUTPATIENT)
Facility: HOSPITAL | Age: 79
End: 2024-09-18
Attending: THORACIC SURGERY (CARDIOTHORACIC VASCULAR SURGERY)
Payer: MEDICARE

## 2024-09-23 ENCOUNTER — HOSPITAL ENCOUNTER (OUTPATIENT)
Facility: HOSPITAL | Age: 79
Setting detail: RECURRING SERIES
Discharge: HOME | End: 2024-09-26
Attending: THORACIC SURGERY (CARDIOTHORACIC VASCULAR SURGERY)
Payer: MEDICARE

## 2024-09-23 PROCEDURE — 93797 PHYS/QHP OP CAR RHAB WO ECG: CPT

## 2024-09-23 PROCEDURE — 93798 PHYS/QHP OP CAR RHAB W/ECG: CPT

## 2024-09-23 ASSESSMENT — EXERCISE STRESS TEST
PEAK_HR: 80
PEAK_METS: 3.8
PEAK_RPE: 13

## 2024-09-24 ENCOUNTER — HOSPITAL ENCOUNTER (OUTPATIENT)
Facility: HOSPITAL | Age: 79
Setting detail: RECURRING SERIES
Discharge: HOME | End: 2024-09-27
Attending: THORACIC SURGERY (CARDIOTHORACIC VASCULAR SURGERY)
Payer: MEDICARE

## 2024-09-24 VITALS — WEIGHT: 176 LBS | BODY MASS INDEX: 28.41 KG/M2

## 2024-09-24 VITALS — BODY MASS INDEX: 28.41 KG/M2 | WEIGHT: 176 LBS

## 2024-09-24 PROCEDURE — 93798 PHYS/QHP OP CAR RHAB W/ECG: CPT

## 2024-09-24 ASSESSMENT — PATIENT HEALTH QUESTIONNAIRE - PHQ9
SUM OF ALL RESPONSES TO PHQ QUESTIONS 1-9: 0
2. FEELING DOWN, DEPRESSED OR HOPELESS: NOT AT ALL
7. TROUBLE CONCENTRATING ON THINGS, SUCH AS READING THE NEWSPAPER OR WATCHING TELEVISION: NOT AT ALL
5. POOR APPETITE OR OVEREATING: NOT AT ALL
8. MOVING OR SPEAKING SO SLOWLY THAT OTHER PEOPLE COULD HAVE NOTICED. OR THE OPPOSITE, BEING SO FIGETY OR RESTLESS THAT YOU HAVE BEEN MOVING AROUND A LOT MORE THAN USUAL: NOT AT ALL
6. FEELING BAD ABOUT YOURSELF - OR THAT YOU ARE A FAILURE OR HAVE LET YOURSELF OR YOUR FAMILY DOWN: NOT AT ALL
SUM OF ALL RESPONSES TO PHQ QUESTIONS 1-9: 0
SUM OF ALL RESPONSES TO PHQ9 QUESTIONS 1 & 2: 0
3. TROUBLE FALLING OR STAYING ASLEEP: NOT AT ALL
SUM OF ALL RESPONSES TO PHQ QUESTIONS 1-9: 0
SUM OF ALL RESPONSES TO PHQ QUESTIONS 1-9: 0
1. LITTLE INTEREST OR PLEASURE IN DOING THINGS: NOT AT ALL
9. THOUGHTS THAT YOU WOULD BE BETTER OFF DEAD, OR OF HURTING YOURSELF: NOT AT ALL
4. FEELING TIRED OR HAVING LITTLE ENERGY: NOT AT ALL
10. IF YOU CHECKED OFF ANY PROBLEMS, HOW DIFFICULT HAVE THESE PROBLEMS MADE IT FOR YOU TO DO YOUR WORK, TAKE CARE OF THINGS AT HOME, OR GET ALONG WITH OTHER PEOPLE: NOT DIFFICULT AT ALL

## 2024-09-24 ASSESSMENT — EXERCISE STRESS TEST
PEAK_RPE: 13
PEAK_HR: 87
PEAK_METS: 3.8

## 2024-09-25 ENCOUNTER — HOSPITAL ENCOUNTER (OUTPATIENT)
Facility: HOSPITAL | Age: 79
Setting detail: RECURRING SERIES
Discharge: HOME | End: 2024-09-28
Attending: THORACIC SURGERY (CARDIOTHORACIC VASCULAR SURGERY)
Payer: MEDICARE

## 2024-09-25 VITALS — BODY MASS INDEX: 28.73 KG/M2 | WEIGHT: 178 LBS

## 2024-09-25 PROCEDURE — 93798 PHYS/QHP OP CAR RHAB W/ECG: CPT

## 2024-09-25 ASSESSMENT — EXERCISE STRESS TEST
PEAK_METS: 3.8
PEAK_RPE: 13
PEAK_HR: 103

## 2024-09-30 ENCOUNTER — HOSPITAL ENCOUNTER (OUTPATIENT)
Facility: HOSPITAL | Age: 79
Setting detail: RECURRING SERIES
Discharge: HOME | End: 2024-10-03
Attending: THORACIC SURGERY (CARDIOTHORACIC VASCULAR SURGERY)
Payer: MEDICARE

## 2024-09-30 VITALS — WEIGHT: 177 LBS | BODY MASS INDEX: 28.57 KG/M2

## 2024-09-30 PROCEDURE — 93797 PHYS/QHP OP CAR RHAB WO ECG: CPT

## 2024-09-30 PROCEDURE — 93798 PHYS/QHP OP CAR RHAB W/ECG: CPT

## 2024-09-30 ASSESSMENT — EXERCISE STRESS TEST
PEAK_RPE: 13
PEAK_METS: 3.8
PEAK_HR: 78

## 2024-09-30 NOTE — CARDIO/PULMONARY
DISCHARGE SUMMARY NOTE  2024      NAME: Sukhdeep Lomeli Jr. : 1945 AGE: 78 y.o.  GENDER: male    CARDIAC REHAB ADMITTING DIAGNOSIS: S/P CABG (coronary artery bypass graft) [Z95.1]    REFERRING PHYSICIAN: José Miguel Chadwick MD    MEDICAL HX:  Past Medical History:   Diagnosis Date    Coronary artery disease     CABG x4     HLD (hyperlipidemia)     HTN (hypertension)        LABS:     No results found for: \"HBA1C\", \"OMD5FHYP\"  No results found for: \"CHOL\", \"CHOLPOCT\", \"CHLST\", \"CHOLV\", \"HDL\", \"HDLPOC\", \"HDLC\", \"LDL\", \"LDLC\", \"VLDLC\", \"VLDL\"      ANTHROPOMETRICS:      Ht Readings from Last 1 Encounters:   24 1.676 m (5' 6\")      Wt Readings from Last 1 Encounters:   24 80.7 kg (178 lb)        WAIST: 36.5         PROGRAM SUMMARY:    Sukhdeep Lomeli Jr. completed 36/36 sessions in the Cardiac Rehab program. He will continue exercising 3 x week and focus on diet and nutrition at home. He attended four  classes and is aware of his cardiac risk factors and cardiac medications. Weight gain was 3 lbs. MET level increase from 2.2 to 3.8. 6MWT distance increased from 237 m to 402 m.      Questions addressed. Discharge plans discussed. Sukhdeep Lomeli JrEfren verbalized understanding.      Jennifer Allison RN

## 2024-10-04 RX ORDER — ATORVASTATIN CALCIUM 40 MG/1
40 TABLET, FILM COATED ORAL DAILY
Qty: 90 TABLET | Refills: 1 | OUTPATIENT
Start: 2024-10-04

## 2024-10-04 RX ORDER — AMIODARONE HYDROCHLORIDE 200 MG/1
TABLET ORAL
Qty: 84 TABLET | Refills: 0 | OUTPATIENT
Start: 2024-10-04

## (undated) DEVICE — DRAPE SLUSH DISC W44XL66IN ST FOR RND BSIN HUSH SLUSH SYS

## (undated) DEVICE — EZ GLIDE AORTIC CANNULA: Brand: EDWARDS LIFESCIENCES EZ GLIDE AORTIC CANNULA

## (undated) DEVICE — CANNULA PERF L2IN BLNT TIP 2MM VES CLR RADPQ BODY FEM LUER

## (undated) DEVICE — CATHETER COR DIAG 4.0 5FR 100CM 0 SIDE H

## (undated) DEVICE — BLANKET WRM W25XL64IN NONWOVEN SFT LTWT PLIABLE HYPR

## (undated) DEVICE — HEART CATH-MRMC: Brand: MEDLINE INDUSTRIES, INC.

## (undated) DEVICE — MEDTRONIC JL4.0 DIAGNOSTIC CATHETER

## (undated) DEVICE — SUTURE PROL SZ 7-0 L30IN NONABSORBABLE BLU L9.3MM BV-1 1/2 8703H

## (undated) DEVICE — SYRINGE ANGIO 10 CC BRL STD PRNT POLYCARB LT BLU MEDALLION

## (undated) DEVICE — DRAIN SURG SGL COLL PT TB FOR ATS BG OASIS

## (undated) DEVICE — SYRINGE MED 50ML LUERLOCK TIP

## (undated) DEVICE — SYRINGE, LUER SLIP, 20ML: Brand: MEDLINE

## (undated) DEVICE — INTENT TO BE USED WITH SUTURE MATERIAL FOR TISSUE CLOSURE: Brand: RICHARD-ALLAN® NEEDLE 1/2 CIRCLE TAPER

## (undated) DEVICE — BLADE OPHTH KNF D3MM 15DEG CATRCT BLU MICRO-SHARP

## (undated) DEVICE — KIT BLWR MISTER 5P 15L W/ TBNG SET IRRIG MIST TO IMPROVE

## (undated) DEVICE — SET PERF L203CM 12IN RED AND BLU AORT ROOT MULT SLIP CONN

## (undated) DEVICE — SUTURE PROL SZ 7-0 L24IN NONABSORBABLE BLU L9.3MM BV-1 3/8 M8702

## (undated) DEVICE — SUTURE NONABSORBABLE MONOFILAMENT 6-0 C-1 1X30 IN PROLENE 8706H

## (undated) DEVICE — Device: Brand: JELCO

## (undated) DEVICE — Device: Brand: CLEANCUT ROTATING AORTIC PUNCH, 4.5 MM

## (undated) DEVICE — BANDAGE COMPR W6INXL10YD ST M E WHITE/BEIGE

## (undated) DEVICE — CANNULA PERF 15FR L12.5IN RG STPCOCK WIREWOUND BODY

## (undated) DEVICE — SYRINGE MED 10ML LUERLOCK TIP W/O SFTY DISP

## (undated) DEVICE — SYRINGE MEDICAL 3ML CLEAR PLASTIC STANDARD NON CONTROL LUERLOCK TIP DISPOSABLE

## (undated) DEVICE — COVER,TABLE,HEAVY DUTY,77"X90",STRL: Brand: MEDLINE

## (undated) DEVICE — MEDI-TRACE CADENCE ADULT, DEFIBRILLATION ELECTRODE -RTS  (10 PR/PK) - PHYSIO-CONTROL: Brand: MEDI-TRACE CADENCE

## (undated) DEVICE — SUTURE MONOCRYL SZ 3-0 L27IN ABSRB UD L24MM PS-1 3/8 CIR PRIM Y936H

## (undated) DEVICE — SYRINGE MED 30ML STD CLR PLAS LUERLOCK TIP N CTRL DISP

## (undated) DEVICE — SYSTEM ENDOSCP VES HARV W/ TOOL CANN SEAL SHT PRT BLNT TIP

## (undated) DEVICE — OPEN HEART A- RICHMOND: Brand: MEDLINE INDUSTRIES, INC.

## (undated) DEVICE — HI-TORQUE VERSACORE FLOPPY GUIDE WIRE SYSTEM 145 CM: Brand: HI-TORQUE VERSACORE

## (undated) DEVICE — SPLINT WR VELC FOAM NEUT POS DISP FOR RAD ART ACC SFT STRP

## (undated) DEVICE — 3M™ MEDIPORE™ H SOFT CLOTH SURGICAL TAPE 2864, 4 INCH X 10 YARD (10CM X 9,14M), 12 ROLLS/CASE: Brand: 3M™ MEDIPORE™

## (undated) DEVICE — AVID DUAL STAGE VENOUS DRAINAGE CANNULA: Brand: AVID DUAL STAGE VENOUS DRAINAGE CANNULA

## (undated) DEVICE — 6 INCH MONITORING EXTENSION SET: Brand: ICU MEDICAL

## (undated) DEVICE — SURGIFOAM SPNG SZ 100

## (undated) DEVICE — 3M™ TEGADERM™ TRANSPARENT FILM DRESSING FRAME STYLE, 1626W, 4 IN X 4-3/4 IN (10 CM X 12 CM), 50/CT 4CT/CASE: Brand: 3M™ TEGADERM™

## (undated) DEVICE — KIT,ANTI FOG,W/SPONGE & FLUID,SOFT PACK: Brand: MEDLINE

## (undated) DEVICE — SET TUBE INSUFFLATION HI FLOW PNEUMOCLEAR

## (undated) DEVICE — GOWN,SIRUS,NONRNF,SETINSLV,XL,20/CS: Brand: MEDLINE

## (undated) DEVICE — 6 FOOT DISPOSABLE EXTENSION CABLE WITH SAFE CONNECT / SCREW-DOWN

## (undated) DEVICE — GLIDESHEATH SLENDER ACCESS KIT: Brand: GLIDESHEATH SLENDER

## (undated) DEVICE — PRESSURE MONITORING SET: Brand: TRUWAVE

## (undated) DEVICE — CENTRAL VENOUS CATHETER SET: Brand: COOK

## (undated) DEVICE — SYRINGE ANGIO 10ML RED FLAT GRP FIX M LUER CONN MEDALLION

## (undated) DEVICE — TRANSFER BAG 300 ML: Brand: HAEMONETICS

## (undated) DEVICE — TUBING PRSS MON L6IN PVC M FEM CONN

## (undated) DEVICE — CANNULA SUCT TIP L8MM DIA24FR INTCARD RIG SUC 0.25IN CONN

## (undated) DEVICE — OPEN HEART B-RICHMOND: Brand: MEDLINE INDUSTRIES, INC.

## (undated) DEVICE — SOLUTION IV 500ML 0.9% SOD CHL PH 5 INJ USP VIAFLX PLAS

## (undated) DEVICE — SOLUTION IRRIG 1000ML STRL H2O USP PLAS POUR BTL

## (undated) DEVICE — SUTURE PROL SZ 4-0 L36IN NONABSORBABLE BLU L26MM SH 1/2 CIR 8521H

## (undated) DEVICE — TEMP PACING WIRE: Brand: MYO/WIRE

## (undated) DEVICE — LIQUIBAND RAPID ADHESIVE 36/CS 0.8ML: Brand: MEDLINE

## (undated) DEVICE — SUTURE VICRYL 3-0 L36IN ABSRB VLT CT-1 L36MM 1/2 CIR J344H

## (undated) DEVICE — GUIDEWIRE VASC L260CM 0.035IN J TIP L3MM PTFE FIX COR NAMIC

## (undated) DEVICE — SUTURE SZ 7 L18IN NONABSORBABLE SIL CCS L48MM 1/2 CIR STRNM M655G

## (undated) DEVICE — CONTAINER,SPECIMEN,OR STERILE,4OZ: Brand: MEDLINE

## (undated) DEVICE — SOLUTION IV 1000ML 140MEQ/L SOD 5MEQ/L K 3MEQ/L MG 27MEQ/L

## (undated) DEVICE — SYRINGE 20ML LL S/C 50

## (undated) DEVICE — BLADE OPHTH 180DEG CUT SURF BLU STR SHRP DBL BVL GRINDLESS

## (undated) DEVICE — AGENT HEMSTAT W4XL4IN OXIDIZED REGENERATED CELOS ABSRB SFT

## (undated) DEVICE — DUAL LUMEN STOMACH TUBE MULTI-FUNCTIONAL PORT: Brand: SALEM SUMP

## (undated) DEVICE — RETRACTOR SURG INSRT SUT HLD OCTOBASE

## (undated) DEVICE — AEGIS 1" DISK 4MM HOLE, PEEL OPEN: Brand: MEDLINE

## (undated) DEVICE — Device: Brand: VIRTUOSAPH PLUS WITH RADIAL INDICATION

## (undated) DEVICE — SOLUTION IV 1000ML 0.9% SOD CHL PH 5 INJ USP VIAFLX PLAS

## (undated) DEVICE — LEAD PACE L475MM CHNL A OR V MYOCARDIAL STEROID ELUT SIL

## (undated) DEVICE — 72" ARTERIAL PRESSURE TUBING: Brand: ICU MEDICAL

## (undated) DEVICE — DRESSING FOAM W8.7XL9.1IN SAFETAC LAYR SELF ADH MEPILEX

## (undated) DEVICE — CANISTER, RIGID, 3000CC: Brand: MEDLINE INDUSTRIES, INC.

## (undated) DEVICE — CANNULA PERF L55IN OD7FR AORT ROOT AG STD TIP FLOW GRD INTRO

## (undated) DEVICE — TR BAND RADIAL ARTERY COMPRESSION DEVICE: Brand: TR BAND

## (undated) DEVICE — SOLUTION IRRIG 1000ML 0.9% SOD CHL USP POUR PLAS BTL

## (undated) DEVICE — CONNECTOR DRNGE W3/8-0.5XH3/16XL3/16IN 2:1 SIL CARD STR

## (undated) DEVICE — TUBING SUCT 12FR MAL ALUM SHFT FN CAP VENT UNIV CONN W/ OBT

## (undated) DEVICE — DRAIN,WOUND,ROUND,24FR,5/16",FULL-FLUTED: Brand: MEDLINE

## (undated) DEVICE — ADHESIVE SKIN CLOSURE XL 42 CM 2.7 CC MESH LIQUIBAND SECUR

## (undated) DEVICE — CATHETER THOR 32FR L23IN PVC 6 EYELET STR ATRAUM